# Patient Record
Sex: FEMALE | Employment: FULL TIME | URBAN - METROPOLITAN AREA
[De-identification: names, ages, dates, MRNs, and addresses within clinical notes are randomized per-mention and may not be internally consistent; named-entity substitution may affect disease eponyms.]

---

## 2018-03-30 ENCOUNTER — OFFICE VISIT (OUTPATIENT)
Dept: FAMILY MEDICINE | Facility: CLINIC | Age: 33
End: 2018-03-30
Payer: COMMERCIAL

## 2018-03-30 VITALS
HEART RATE: 68 BPM | TEMPERATURE: 98.1 F | WEIGHT: 158.8 LBS | BODY MASS INDEX: 28.14 KG/M2 | SYSTOLIC BLOOD PRESSURE: 120 MMHG | HEIGHT: 63 IN | DIASTOLIC BLOOD PRESSURE: 68 MMHG

## 2018-03-30 DIAGNOSIS — Z32.01 PREGNANCY CONFIRMED BY POSITIVE URINE TEST: Primary | ICD-10-CM

## 2018-03-30 LAB — BETA HCG QUAL IFA URINE: POSITIVE

## 2018-03-30 PROCEDURE — 99203 OFFICE O/P NEW LOW 30 MIN: CPT | Performed by: NURSE PRACTITIONER

## 2018-03-30 PROCEDURE — 84703 CHORIONIC GONADOTROPIN ASSAY: CPT | Performed by: NURSE PRACTITIONER

## 2018-03-30 NOTE — PATIENT INSTRUCTIONS
Congratulations!    Tylenol is safe to use in pregnancy  Claritin is safe to use    Results for orders placed or performed in visit on 03/30/18 (from the past 24 hour(s))   Beta HCG qual IFA urine - [facility] [place]   Result Value Ref Range    Beta HCG Qual IFA Urine Positive (A) NEG^Negative          Medical assistant will help you schedule a nurse visit for prenatal education    [facility]   Discharged by : Sandra CANTU MA    If you have any questions regarding your visit please contact your care team:     Team Gold                Clinic Hours Telephone Number     Dr. Radha Escobedo, NP 7am-7pm  Monday - Thursday   7am-5pm  Fridays  [phone]   (Appointment scheduling available 24/7)     RN Line  [phone] option 2     [facility] [place] [facility] 11am-9pm Monday-Friday Saturday-Sunday- 9am-5pm     [place] -   5pm-9pm Monday-Friday Saturday-Sunday- 9am-5pm    [phone] - [place]    [phone] - [place]       For a Price Quote for your services, please call our Consumer Price Line at [phone].     What options do I have for visits at the clinic other than the traditional office visit?     To expand how we care for you, many of our providers are utilizing electronic visits (e-visits) and telephone visits, when medically appropriate, for interactions with their patients rather than a visit in the clinic. We also offer nurse visits for many medical concerns. Just like any other service, we will bill your insurance company for this type of visit based on time spent on the phone with your provider. Not all insurance companies cover these visits. Please check with your medical insurance if this type of visit is covered. You will be responsible for any charges that are not paid by your insurance.   E-visits via [email or web address]: generally incur a $35.00 fee.     Telephone visits:    Time spent on the phone: *charged based on time that is spent on the phone in increments of 10 minutes. Estimated cost:   5-10 mins $30.00   11-20 mins. $59.00   21-30 mins. $85.00     Use [employer] (secure email communication and access to your chart) to send your primary care provider a message or make an appointment. Ask someone on your Team how to sign up for [employer].     As always, Thank you for trusting us with your health care needs!      [facility]:    Scheduling Appointments  [place], [place]  Call: [phone]    Pardeep Henry, [facility]  Call: [phone]    [facility]  Call: [phone]    For Gastroenterology referrals   [facility]   Clinics and Surgery Center, 4th Floor   [address] [place] [address]   Appointments: [phone]    WHERE TO GO FOR CARE?  Clinic    Make an appointment if you:       Are sick (cold, cough, flu, sore throat, earache or in pain).       Have a small injury (sprain, small cut, burn or broken bone).       Need a physical exam, Pap smear, vaccine or prescription refill.       Have questions about your health or medicines.    To reach us:      Call [phone] ([phone]). Open 24 hours every day. (For counseling services, call [phone].)    Log into [employer] at [email or web address]. (Visit [email or web address] to create an account.) Hospital emergency room    An emergency is a serious or life- threatening problem that must be treated right away.    Call 559 or get to the hospital if you have:      Very bad or sudden:            - Chest pain or pressure         - Bleeding         - Head or belly pain         - Dizziness or trouble seeing, walking or                          Speaking      Problems breathing      Blood in your vomit or you are coughing up blood      A major injury (knocked out, loss of a finger or limb, rape, broken bone protruding from skin)    A  mental health crisis. (Or call the Mental Health Crisis line at 1-623.696.9725 or Suicide Prevention Hotline at 1-328.776.2347.)    Open 24 hours every day. You don't need an appointment.     Urgent care    Visit urgent care for sickness or small injuries when the clinic is closed. You don't need an appointment. To check hours or find an urgent care near you, visit [email or web address]. Online care    Get online care from [facility] for more than 70 common problems, like colds, allergies and infections. Open 24 hours every day at:   [email or web address]   Need help deciding?    For advice about where to be seen, you may call your clinic and ask to speak with a nurse. We're here for you 24 hours every day.         If you are deaf or hard of hearing, please let us know. We provide many free services including sign language interpreters, oral interpreters, TTYs, telephone amplifiers, note takers and written materials.

## 2018-03-30 NOTE — NURSING NOTE
"Chief Complaint   Patient presents with     Pregnancy Test       Initial /68 (BP Location: Right arm, Patient Position: Sitting, Cuff Size: Adult Regular)  Pulse 68  Temp 98.1  F (36.7  C) (Oral)  Ht 5' 3.25\" (1.607 m)  Wt 158 lb 12.8 oz (72 kg)  LMP 02/10/2018  BMI 27.91 kg/m2 Estimated body mass index is 27.91 kg/(m^2) as calculated from the following:    Height as of this encounter: 5' 3.25\" (1.607 m).    Weight as of this encounter: 158 lb 12.8 oz (72 kg).  Medication Reconciliation: complete    "

## 2018-03-30 NOTE — PROGRESS NOTES
SUBJECTIVE:   Rashawn Jones is a 33 year old female who presents to clinic today for the following health issues:    Patient would like pregnancy test - 19 days late on period. Has NOT done home pregnancy test. Patient stated she and  have been trying for pregnancy since 2017. Patient has been feeling okay- just a little nausea and frequent urination, breast tenderness, a little tiredness.    LMP 2/10/2018 and normally gets regular monthly periods.  Was using condoms for the past 5 years prior to stopping in 2017.  Never been pregnant before.    Social History:  Born and raised in [place] until grade 12.  Came to [place] for college 2 years; then transferred to [place] for last 2 years.  Met her partner in [place].  Her dad is from [place], mom is from the [place] and they met in [place].  She reads and writes in Croatian but cannot speak it.  Speaks Sophie to father.  Speaks Tegalo to mother.    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  Additional history: as documented    Patient Active Problem List   Diagnosis     Pregnancy confirmed by positive urine test     Past Surgical History:   Procedure Laterality Date     NO HISTORY OF SURGERY         Social History   Substance Use Topics     Smoking status: Never Smoker     Smokeless tobacco: Never Used     Alcohol use No     Family History   Problem Relation Age of Onset     Hypertension Mother      Hypertension Father      DIABETES Father      CANCER Maternal Grandfather      Skin/ Nose?     DIABETES Paternal Grandmother      KIDNEY DISEASE Paternal Grandmother      was on dialysis     Myocardial Infarction Paternal Grandfather 50     x2,  from 2nd heart attack     No Known Problems Brother      No Known Problems Sister      No Known Problems Sister          Current Outpatient Prescriptions   Medication Sig Dispense Refill     Prenatal Vit-Fe Fumarate-FA (PRENATAL VITAMIN PO)  "Take 1 tablet by mouth daily       No Known Allergies    Reviewed and updated as needed this visit by clinical staff  Tobacco  Allergies  Meds  Problems  Med Hx  Surg Hx  Fam Hx  Soc Hx        Reviewed and updated as needed this visit by Provider  Allergies  Meds  Problems         ROS:  Constitutional, HEENT, cardiovascular, pulmonary, gi and gu systems are negative, except as otherwise noted.    OBJECTIVE:     /68 (BP Location: Right arm, Patient Position: Sitting, Cuff Size: Adult Regular)  Pulse 68  Temp 98.1  F (36.7  C) (Oral)  Ht 5' 3.25\" (1.607 m)  Wt 158 lb 12.8 oz (72 kg)  LMP 02/10/2018  BMI 27.91 kg/m2  Body mass index is 27.91 kg/(m^2).  GENERAL: healthy, alert and no distress  EYES: Eyes grossly normal to inspection, PERRL and conjunctivae and sclerae normal  HENT: ear canals and TM's normal, nose and mouth without ulcers or lesions  NECK: no adenopathy, no asymmetry, masses, or scars and thyroid normal to palpation  RESP: lungs clear to auscultation - no rales, rhonchi or wheezes  CV: regular rate and rhythm, normal S1 S2, no S3 or S4, no murmur, click or rub, no peripheral edema and peripheral pulses strong    Diagnostic Test Results:  Urine pregnancy test positive    ASSESSMENT/PLAN:     1. Pregnancy confirmed by positive urine test    - Beta HCG qual IFA urine - [facility] and [place]  - Prenatal Vit-Fe Fumarate-FA (PRENATAL VITAMIN PO); Take 1 tablet by mouth daily  - Briefly advised patient to eat healthy, get some exercise, avoid alcohol/smoke, take prenatal vitamin daily, and okay to take Tylenol (avoid NSAIDs) if needed.    FUTURE APPOINTMENTS:       - Follow-up visit with the nurse for prenatal education and Patient/guardian verbalized understanding and agreement with the plan.    Sofía Escobedo NP  [facility]  "

## 2018-03-30 NOTE — MR AVS SNAPSHOT
"              After Visit Summary   3/30/2018    Rashawn Jones    MRN: [id number]           Patient Information     Date Of Birth          1985        Visit Information        Provider Department      3/30/2018 1:40 PM Sofía Escobedo NP [facility]        Today's Diagnoses     Amenorrhea    -  1      Care Instructions    Congratulations!    Tylenol is safe to use in pregnancy  Claritin is safe to use    Results for orders placed or performed in visit on 03/30/18 (from the past 24 hour(s))   Beta HCG qual IFA urine - [facility] and [place]   Result Value Ref Range    Beta HCG Qual IFA Urine Positive (A) NEG^Negative          Medical assistant will help you schedule a nurse visit for prenatal education          Follow-ups after your visit        Who to contact     If you have questions or need follow up information about today's clinic visit or your schedule please contact [facility] directly at [phone].  Normal or non-critical lab and imaging results will be communicated to you by [employer], letter or phone within 4 business days after the clinic has received the results. If you do not hear from us within 7 days, please contact the clinic through Cell[employer] or phone. If you have a critical or abnormal lab result, we will notify you by phone as soon as possible.  Submit refill requests through [employer] or call your pharmacy and they will forward the refill request to us. Please allow 3 business days for your refill to be completed.          Additional Information About Your Visit        Cellayhart Information     [employer] lets you send messages to your doctor, view your test results, renew your prescriptions, schedule appointments and more. To sign up, go to [email or web address] . Click on \"Log in\" on the left side of the screen, which will take you to the Welcome page. Then click on \"Sign up Now\" on the right side of the page.     You will be asked to enter the access code " "listed below, as well as some personal information. Please follow the directions to create your username and password.     Your access code is: [email or web address]  Expires: 2018  2:55 PM     Your access code will  in 90 days. If you need help or a new code, please call your [place] clinic or [phone].        Care EveryWhere ID     This is your Care EveryWhere ID. This could be used by other organizations to access your [place] medical records  [id number]        Your Vitals Were     Pulse Temperature Height Last Period BMI (Body Mass Index)       68 98.1  F (36.7  C) (Oral) 5' 3.25\" (1.607 m) 02/10/2018 27.91 kg/m2        Blood Pressure from Last 3 Encounters:   18 120/68    Weight from Last 3 Encounters:   18 158 lb 12.8 oz (72 kg)              We Performed the Following     Beta HCG qual [facility] urine - [facility] and [place]        Primary Care Provider Office Phone # Fax #    [facility] [phone]       [address] [place] [address]        Equal Access to Services     RUTHIE THAYER : Hadii aad ku hadasho Soomaali, waaxda luqadaha, qaybta kaalmada adeegyada, waxay idiin hayaan adeeg bella lakalyn ah. So [facility] [place] [facility] [phone].    ATENCIÓN: Si habla español, tiene a kirkpatrick disposición servicios gratuitos de asistencia lingüística. Ronaldo al [phone].    We comply with applicable federal civil rights laws and [place] laws. We do not discriminate on the basis of race, color, national origin, age, disability, sex, sexual orientation, or gender identity.            Thank you!     Thank you for choosing [facility]  for your care. Our goal is always to provide you with excellent care. Hearing back from our patients is one way we can continue to improve our services. Please take a few minutes to complete the written survey that you may receive in the mail after your visit with us. Thank you!             Your Updated Medication List - Protect " others around you: Learn how to safely use, store and throw away your medicines at www.disposemymeds.org.          This list is accurate as of 3/30/18  2:55 PM.  Always use your most recent med list.                   Brand Name Dispense Instructions for use Diagnosis    PRENATAL VITAMIN PO      Take 1 tablet by mouth daily

## 2018-03-31 PROBLEM — Z32.01 PREGNANCY CONFIRMED BY POSITIVE URINE TEST: Status: ACTIVE | Noted: 2018-03-31

## 2018-04-12 ENCOUNTER — PRENATAL OFFICE VISIT (OUTPATIENT)
Dept: NURSING | Facility: CLINIC | Age: 33
End: 2018-04-12
Payer: COMMERCIAL

## 2018-04-12 VITALS
SYSTOLIC BLOOD PRESSURE: 112 MMHG | HEIGHT: 64 IN | BODY MASS INDEX: 27.31 KG/M2 | DIASTOLIC BLOOD PRESSURE: 64 MMHG | WEIGHT: 160 LBS | HEART RATE: 74 BPM

## 2018-04-12 DIAGNOSIS — Z34.00 SUPERVISION OF NORMAL FIRST PREGNANCY: Primary | ICD-10-CM

## 2018-04-12 LAB
ABO + RH BLD: NORMAL
ABO + RH BLD: NORMAL
ALBUMIN UR-MCNC: NEGATIVE MG/DL
APPEARANCE UR: CLEAR
BACTERIA #/AREA URNS HPF: ABNORMAL /HPF
BILIRUB UR QL STRIP: NEGATIVE
BLD GP AB SCN SERPL QL: NORMAL
BLOOD BANK CMNT PATIENT-IMP: NORMAL
COLOR UR AUTO: YELLOW
ERYTHROCYTE [DISTWIDTH] IN BLOOD BY AUTOMATED COUNT: 13.9 % (ref 10–15)
GLUCOSE UR STRIP-MCNC: NEGATIVE MG/DL
HCT VFR BLD AUTO: 35.3 % (ref 35–47)
HGB BLD-MCNC: 11.9 G/DL (ref 11.7–15.7)
HGB UR QL STRIP: NEGATIVE
KETONES UR STRIP-MCNC: NEGATIVE MG/DL
LEUKOCYTE ESTERASE UR QL STRIP: ABNORMAL
MCH RBC QN AUTO: 28 PG (ref 26.5–33)
MCHC RBC AUTO-ENTMCNC: 33.7 G/DL (ref 31.5–36.5)
MCV RBC AUTO: 83 FL (ref 78–100)
NITRATE UR QL: NEGATIVE
NON-SQ EPI CELLS #/AREA URNS LPF: ABNORMAL /LPF
PH UR STRIP: 6 PH (ref 5–7)
PLATELET # BLD AUTO: 286 10E9/L (ref 150–450)
RBC # BLD AUTO: 4.25 10E12/L (ref 3.8–5.2)
RBC #/AREA URNS AUTO: ABNORMAL /HPF
SOURCE: ABNORMAL
SP GR UR STRIP: 1.01 (ref 1–1.03)
SPECIMEN EXP DATE BLD: NORMAL
UROBILINOGEN UR STRIP-ACNC: 0.2 EU/DL (ref 0.2–1)
WBC # BLD AUTO: 8.4 10E9/L (ref 4–11)
WBC #/AREA URNS AUTO: ABNORMAL /HPF

## 2018-04-12 PROCEDURE — 36415 COLL VENOUS BLD VENIPUNCTURE: CPT | Performed by: OBSTETRICS & GYNECOLOGY

## 2018-04-12 PROCEDURE — 87340 HEPATITIS B SURFACE AG IA: CPT | Performed by: OBSTETRICS & GYNECOLOGY

## 2018-04-12 PROCEDURE — 85027 COMPLETE CBC AUTOMATED: CPT | Performed by: OBSTETRICS & GYNECOLOGY

## 2018-04-12 PROCEDURE — 99207 ZZC NO CHARGE NURSE ONLY: CPT

## 2018-04-12 PROCEDURE — 81001 URINALYSIS AUTO W/SCOPE: CPT | Performed by: OBSTETRICS & GYNECOLOGY

## 2018-04-12 PROCEDURE — 86900 BLOOD TYPING SEROLOGIC ABO: CPT | Performed by: OBSTETRICS & GYNECOLOGY

## 2018-04-12 PROCEDURE — 86780 TREPONEMA PALLIDUM: CPT | Performed by: OBSTETRICS & GYNECOLOGY

## 2018-04-12 PROCEDURE — 87086 URINE CULTURE/COLONY COUNT: CPT | Performed by: OBSTETRICS & GYNECOLOGY

## 2018-04-12 PROCEDURE — 86901 BLOOD TYPING SEROLOGIC RH(D): CPT | Performed by: OBSTETRICS & GYNECOLOGY

## 2018-04-12 PROCEDURE — 86762 RUBELLA ANTIBODY: CPT | Performed by: OBSTETRICS & GYNECOLOGY

## 2018-04-12 PROCEDURE — 87389 HIV-1 AG W/HIV-1&-2 AB AG IA: CPT | Performed by: OBSTETRICS & GYNECOLOGY

## 2018-04-12 PROCEDURE — 86850 RBC ANTIBODY SCREEN: CPT | Performed by: OBSTETRICS & GYNECOLOGY

## 2018-04-12 NOTE — PROGRESS NOTES
Prenatal OB Questionnaire  Past Medical History  Diabetes   No  Hypertension   No  Heart Disease, mitral valve prolapse, or rheumatic fever?   No  An autoimmune disorder such as Lupus or Rheumatoid Arthritis?   No  Kidney Disease or Urinary Tract Infection?   No  Epilepsy, seizures or spells?   No  Migraine headaches?   No  A stroke or loss of function or sensation?   No  Any other neurological problems?   No  Have you ever been treated for depression?  No  Are you having problems with crying spells or loss of self-esteem?   No  Have you ever required psychiatric care?   No  Have you ever hepatitis, liver disease or jaundice?   No  Have you ever been treated for blood clots in your veins, deep venous thrombosis, inflammation in the veins, thrombosis, phlebitis, pulmonary embolism or varicosities?   No  Have you had excessive bleeding after surgery or dental work?   No  Do you bleed more than other women after a cut or scratch?   No  Do you have a history of anemia?   No  Have you ever been treated for thyroid problems or taken thyroid medication?  No  Do you have any other endocrine problems?  No  Have you ever been in a major accident or suffered serious trauma?   No  Within the last year, has anyone hit slapped, kicked or otherwise hurt you?  No  In the last year, has anyone forced you to have sex when you didn't want to?  No  Have you ever had a blood transfusion?   No  Would you refuse a blood transfusion if a doctor judged it to be medically necessary?   No  If you answered yes, would you rather die than have a blood transfusion?   No  If you answered yes, is this for Scientology reasons?   No  Does anyone in your home smoke?   No  Do you use tobacco products?  No  Do you drink beer, wine, hard liquor?  No  Do you use any of the following: marijuana, speed, cocaine, heroine, hallucinogens, or other drugs?  No  Is your blood type Rh negative?   No  Have you ever had abnormal antibodies in your blood?   No  Have  you ever had asthma?   No  Have you ever had tuberculosis?   No  Do you have any allergies to drugs or over-the-counter medications?   No    Allergies as of 4/12/2018:    Allergies as of 04/12/2018     (No Known Allergies)       Do you have any breast problems?   No  Have you ever ?   No  Have you had any gynecological surgical procedures such as cervical conization, a LEEP procedure, laser treatment, cryosurgery of the cervix, or a dilation and curettage, etc?  No  Have you had any other surgical procedures?  No  Have you been hospitalized for a nonsurgical reason excluding normal delivery?   No  Have you ever had any anesthetic complications?   No  Have you ever had an abnormal pap smear?   No  Do you have a history of abnormalities of the uterus?   No  Did it take you more than one year to become pregnant?   No  Have you ever been evaluated or treated for infertility?   No  Is there a history of medical problems in your family, which you feel might adversely affect your health or pregnancy?   No  Do you have any other problems we have not asked you about which you feel may be important to this pregnancy?  No    Symptoms since Last Menstrual Period  Do you have any of the following:    *abdominal pain  No  *blood in stool or urine  No  *chest pain  No  *shortness of breath  No  *coughing or vomiting up blood No  *heart racing or skipping beats  No  *nausea and vomiting  No  *pain with urination  No  *vaginal discharge or bleeding  No  Current medications are:  Current Outpatient Prescriptions   Medication Sig Dispense Refill     Prenatal Vit-Fe Fumarate-FA (PRENATAL VITAMIN PO) Take 1 tablet by mouth daily         Genetic Screening  At the time of birth, will you be 35 years old or older?  No  Has the patient, baby s father, or anyone in either family had:  Thalassemia (Italian, Greek, Mediterranean, or  background only) and an MCV result less than 80?  No  Neural tube defect such as  meningomyelocele, spina bifida or anencephaly?  No  Congenital heart defect?  No  Down s syndrome?  No  Jonathon-Sach s disease (Caodaism, Cajun, Burundian-Honduran)?  No  Sickle cell disease or trait ([place])?  No  Hemophilia or other inherited problems of blood coagulation? No  Muscular dystrophy?  No  Cystic Fibrosis?  No  [place] s chorea?  No  Mental retardation/autism? No   If yes, was the person tested for fragile X?  No  Any other inherited genetic or chromosomal disorder?  No  Maternal metabolic disorder (e.g. insulin-dependent diabetes, PKU)? No  A child with birth defects not listed above?  No  Recurrent pregnancy loss or a stillbirth?  No  Has the patient had any medications/street drugs/alcohol since her last menstrual period? No  Does the patient or baby s father have any other genetic risks?  No  Infection History  Do you object to being tested for Hepatitis B? No  Do you object to being tested for HIV? No  Do you feel that you are at high risk for coming in contact with the AIDS virus?  No  Have you ever been treated for tuberculosis?  No  Have you ever received the BCG vaccine for tuberculosis? yes  Have you ever had a positive skin test for tuberculosis? Yes due to immu  Do you live with someone who has tuberculosis?  No  Have you ever been exposed to tuberculosis?  No  Do you have genital herpes?  No  Does your partner have genital herpes?  No  Have you had a rash or viral illness since your last period?  No  Have you ever had Gonorrhea, Chlamydia, Syphilis, venereal warts, trichomoniasis, pelvic inflammatory disease or any other sexually transmitted disease?  No  Do you know if you are a genital group B streptococcus carrier? No  You have not had chicken pox/varicella  has had the chicken pox as a child  Have you been vaccinated against chicken pox?  No  Have you had any other infectious disease? No        Early ultrasound screening tool:    Does patient have irregular periods?  No  Did patient use  hormonal birth control in the three months prior to positive urine pregnancy test? No  Is the patient breastfeeding?  No  Is the patient 10 weeks or greater at time of education visit?  No    No early ultrasound indicated    Patient presents to clinic today prenatal education. This is the patient's 0 pregnancy. She has had 0 miscarriage(s), 0 (s), 0 term birth(s) and 0  birth(s). She has 0 living child(denita).   This {was a planned pregnancy.  Reviewed answers to patient's Prenatal OB Questionnaire.    Medical, surgical, family and ObGyn history updated as appropriate. Reviewed current medications and allergies. Patient {is taking prenatal vitamins.     Discussed all of the options within [place] for her prenatal care including Dr. Phillips and Dr. Goldstein at [facility], midwives at [facility] and OB/GYN-Dr. Rg and Dr. Reyes. Next visit scheduled with Dr Rg.     Reviewed and discussed OB 1st Trimester Plans/Education  and also summarized in detail handouts and brochures included in OB Prenatal Folder that patient is able to keep and bring home with her.    Discussed all of the blood tests with pt who agrees to have all including HIV. Pt aware that results will be discussed at next office visit with MD unless abnl results are indicated and phone call will be made.    Will forward chart on to Prenatal Care Provider to review.    Daniela Tristan,Clinic Rn  [place]

## 2018-04-12 NOTE — MR AVS SNAPSHOT
"              After Visit Summary   4/12/2018    Rashawn Jones    MRN: [id number]           Patient Information     Date Of Birth          1985        Visit Information        Provider Department      4/12/2018 11:00 AM NE RN [facility]        Today's Diagnoses     Supervision of normal first pregnancy    -  1       Follow-ups after your visit        Your next 10 appointments already scheduled     Apr 25, 2018  3:30 PM CDT   New Prenatal with Marla Rg [facility]   [facility]    [address] [place] [address] [phone]              Who to contact     If you have questions or need follow up information about today's clinic visit or your schedule please contact [facility] directly at [phone].  Normal or non-critical lab and imaging results will be communicated to you by MyChart, letter or phone within 4 business days after the clinic has received the results. If you do not hear from us within 7 days, please contact the clinic through MyChart or phone. If you have a critical or abnormal lab result, we will notify you by phone as soon as possible.  Submit refill requests through [employer] or call your pharmacy and they will forward the refill request to us. Please allow 3 business days for your refill to be completed.          Additional Information About Your Visit        MyChart Information     [facility] [employer] lets you send messages to your doctor, view your test results, renew your prescriptions, schedule appointments and more. To sign up, go to [email or web address] . Click on \"Log in\" on the left side of the screen, which will take you to the Welcome page. Then click on \"Sign up Now\" on the right side of the page.     You will be asked to enter the access code listed below, as well as some personal information. Please follow the directions to create your username and password.     Your access " "code is: [email or web address]  Expires: 2018  2:55 PM     Your access code will  in 90 days. If you need help or a new code, please call your [place] clinic or [phone].        Care EveryWhere ID     This is your Care EveryWhere ID. This could be used by other organizations to access your [place] medical records  [id number]        Your Vitals Were     Pulse Height Last Period BMI (Body Mass Index)          74 5' 4.17\" (1.63 m) 02/10/2018 27.32 kg/m2         Blood Pressure from Last 3 Encounters:   18 112/64   18 120/68    Weight from Last 3 Encounters:   18 160 lb (72.6 kg)   18 158 lb 12.8 oz (72 kg)              We Performed the Following     ABO/RH Type and Screen     Anti Treponema     CBC with Platelets     Hepatitis B surface antigen     HIV Antigen Antibody Combo     Rubella Antibody IgG Quantitative     UA  Macroscopoic with Reflex to Micro     Urine Culture Aerobic Bacterial        Primary Care Provider Office Phone # Fax #    [facility] [phone]       [address] [facility] [address]        Equal Access to Services     RUTHIE THAYER AH: Hadii aad ku hadasho Soomaali, waaxda luqadaha, qaybta kaalmada adeegyada, waxay idiin hayhenriquen alexander blanco lakalyn de la cruz. So [facility] [phone].    ATENCIÓN: Si habla español, tiene a kirkpatrick disposición servicios gratuitos de asistencia lingüística. Llame al [phone].    We comply with applicable federal civil rights laws and [place] laws. We do not discriminate on the basis of race, color, national origin, age, disability, sex, sexual orientation, or gender identity.            Thank you!     Thank you for choosing [facility]  for your care. Our goal is always to provide you with excellent care. Hearing back from our patients is one way we can continue to improve our services. Please take a few minutes to complete the written survey that you may receive in the mail after your " visit with us. Thank you!             Your Updated Medication List - Protect others around you: Learn how to safely use, store and throw away your medicines at www.disposemymeds.org.          This list is accurate as of 4/12/18 11:48 AM.  Always use your most recent med list.                   Brand Name Dispense Instructions for use Diagnosis    PRENATAL VITAMIN PO      Take 1 tablet by mouth daily    Pregnancy confirmed by positive urine test

## 2018-04-13 LAB
BACTERIA SPEC CULT: NO GROWTH
HBV SURFACE AG SERPL QL IA: NONREACTIVE
HIV 1+2 AB+HIV1 P24 AG SERPL QL IA: NONREACTIVE
RUBV IGG SERPL IA-ACNC: 81 IU/ML
SPECIMEN SOURCE: NORMAL
T PALLIDUM IGG+IGM SER QL: NEGATIVE

## 2018-04-25 ENCOUNTER — HEALTH MAINTENANCE LETTER (OUTPATIENT)
Age: 33
End: 2018-04-25

## 2018-04-25 ENCOUNTER — PRENATAL OFFICE VISIT (OUTPATIENT)
Dept: OBGYN | Facility: CLINIC | Age: 33
End: 2018-04-25
Payer: COMMERCIAL

## 2018-04-25 VITALS
HEART RATE: 65 BPM | HEIGHT: 64 IN | TEMPERATURE: 98.8 F | BODY MASS INDEX: 27.21 KG/M2 | WEIGHT: 159.4 LBS | DIASTOLIC BLOOD PRESSURE: 71 MMHG | OXYGEN SATURATION: 100 % | SYSTOLIC BLOOD PRESSURE: 112 MMHG

## 2018-04-25 DIAGNOSIS — Z34.01 NORMAL FIRST PREGNANCY IN FIRST TRIMESTER: Primary | ICD-10-CM

## 2018-04-25 PROCEDURE — G0145 SCR C/V CYTO,THINLAYER,RESCR: HCPCS | Performed by: OBSTETRICS & GYNECOLOGY

## 2018-04-25 PROCEDURE — 87624 HPV HI-RISK TYP POOLED RSLT: CPT | Performed by: OBSTETRICS & GYNECOLOGY

## 2018-04-25 PROCEDURE — 99207 ZZC FIRST OB VISIT: CPT | Performed by: OBSTETRICS & GYNECOLOGY

## 2018-04-25 NOTE — MR AVS SNAPSHOT
"              After Visit Summary   4/25/2018    Rashawn Jones    MRN: [id number]           Patient Information     Date Of Birth          1985        Visit Information        Provider Department      4/25/2018 3:30 PM Marla Rg MD [facility]        Today's Diagnoses     Normal first pregnancy in first trimester    -  1       Follow-ups after your visit        Who to contact     If you have questions or need follow up information about today's clinic visit or your schedule please contact [facility] [place] directly at [phone].  Normal or non-critical lab and imaging results will be communicated to you by [employer], letter or phone within 4 business days after the clinic has received the results. If you do not hear from us within 7 days, please contact the clinic through [employer] or phone. If you have a critical or abnormal lab result, we will notify you by phone as soon as possible.  Submit refill requests through [employer] or call your pharmacy and they will forward the refill request to us. Please allow 3 business days for your refill to be completed.          Additional Information About Your Visit        MyChart Information     [employer] gives you secure access to your electronic health record. If you see a primary care provider, you can also send messages to your care team and make appointments. If you have questions, please call your primary care clinic.  If you do not have a primary care provider, please call [phone] and they will assist you.        Care EveryWhere ID     This is your Care EveryWhere ID. This could be used by other organizations to access your [facility] medical records  [id number]        Your Vitals Were     Pulse Temperature Height Last Period Pulse Oximetry BMI (Body Mass Index)    65 98.8  F (37.1  C) (Oral) 5' 4.14\" (1.629 m) 02/10/2018 100% 27.24 kg/m2       Blood Pressure from Last 3 Encounters:   04/25/18 112/71   04/12/18 112/64 "   03/30/18 120/68    Weight from Last 3 Encounters:   04/25/18 159 lb 6.4 oz (72.3 kg)   04/12/18 160 lb (72.6 kg)   03/30/18 158 lb 12.8 oz (72 kg)              We Performed the Following     HPV High Risk Types DNA Cervical     Pap imaged thin layer screen with HPV - recommended age 30 - 65 years (select HPV order below)     US OB Limited One Or More Fetuses        Primary Care Provider Office Phone # Fax #    [facility] [phone]       [address] [facility] [address]        Equal Access to Services     RUTHIE THAYER : Hadseferino Betancourt, marcus crocker, martha kaalmaerin mendenhall, tip welch . So [facility] [phone].    ATENCIÓN: Si habla español, tiene a kirkpatrick disposición servicios gratuitos de asistencia lingüística. Llame al [phone].    We comply with applicable federal civil rights laws and [place] laws. We do not discriminate on the basis of race, color, national origin, age, disability, sex, sexual orientation, or gender identity.            Thank you!     Thank you for choosing [facility]  for your care. Our goal is always to provide you with excellent care. Hearing back from our patients is one way we can continue to improve our services. Please take a few minutes to complete the written survey that you may receive in the mail after your visit with us. Thank you!             Your Updated Medication List - Protect others around you: Learn how to safely use, store and throw away your medicines at www.disposemymeds.org.          This list is accurate as of 4/25/18  5:47 PM.  Always use your most recent med list.                   Brand Name Dispense Instructions for use Diagnosis    PRENATAL VITAMIN PO      Take 1 tablet by mouth daily    Pregnancy confirmed by positive urine test

## 2018-04-25 NOTE — PROGRESS NOTES
"SUBJECTIVE:   Rashawn Jones is a  33 year old female  @ 10w4d by LMP who presents for a new Ob visit.  Here with her , Royal.    She is feeling well but quite nauseated and tired.  Craving meat and greens.   Has had no bleeding or spotting.   No US yet.     POBHx: none      Patient Active Problem List   Diagnosis     Pregnancy confirmed by positive urine test       No past medical history on file.    Past Surgical History:   Procedure Laterality Date     NO HISTORY OF SURGERY          Current Outpatient Prescriptions   Medication     Prenatal Vit-Fe Fumarate-FA (PRENATAL VITAMIN PO)     No current facility-administered medications for this visit.        Allergies   Allergen Reactions     No Clinical Screening - See Comments      Chick peas and cumcumber         EXAM:  /71 (BP Location: Right arm, Patient Position: Sitting, Cuff Size: Adult Regular)  Pulse 65  Temp 98.8  F (37.1  C) (Oral)  Ht 5' 4.14\" (1.629 m)  Wt 159 lb 6.4 oz (72.3 kg)  LMP 02/10/2018  SpO2 100%  BMI 27.24 kg/m2    GENERAL: WDWN F in NAD  HEENT: no abnormalities  NECK: without thyromegaly or adenopathy  CHEST: clear to auscultation  CV: RRR without murmur  BREASTS: no palpable masses or adenopathy, no asymmetry or skin dimpling  ABDOMEN: S, NT, no palpable masses or hepatosplenomegaly  MUSCULOSKELETAL: no obvious abnormalities.  NEUROLOGICAL: normal strength, sensation, mental status  PSYCH: normal affect, appropriate  PELVIC: EG - normal adult female.  BUS - within normal limits.  Vagina - well rugated, no discharge.  Cervix - no lesions, no CMT.  Uterus - 10 wk  size and nontender.  Adnexae - no masses or tenderness.  RV - deferred.    BEDSIDE U/S: no fetal heart tones with doptone so bd u/s shows single live IUP, normal yolk sac and gestational sac, normal cardiac activity and fetal movement,  CRL= 10w4d        ASSESSMENT/ PLAN:  IUP @ 10w4d by LMP c/w bedside US today.     Pap done today.     Discussed routine " prenatal care and first trimester screen testing.    She will see in insurance covers the  the first trimester screen and call to let us know if she wants it.      Patient was counselled on routine prenatal care, diet and nutrition.  discussed iron rich diet.   New Ob labs reviewed today.    Return to Clinic in 4 weeks or sooner if problems arise.    Marla Rg MD

## 2018-04-25 NOTE — PROGRESS NOTES
"Chief Complaint   Patient presents with     Prenatal Care     10+4.       Initial Ht 5' 4.14\" (1.629 m)  LMP 02/10/2018 Estimated body mass index is 27.32 kg/(m^2) as calculated from the following:    Height as of 18: 5' 4.17\" (1.63 m).    Weight as of 18: 160 lb (72.6 kg).  BP completed using cuff size: regular        The following HM Due: pap smear      The following patient reported/Care Every where data was sent to:  P ABSTRACT QUALITY INITIATIVES [40619]  n/a      patient has appointment for today and orders have been placed              "

## 2018-04-27 LAB
COPATH REPORT: NORMAL
PAP: NORMAL

## 2018-05-01 LAB
FINAL DIAGNOSIS: NORMAL
HPV HR 12 DNA CVX QL NAA+PROBE: NEGATIVE
HPV16 DNA SPEC QL NAA+PROBE: NEGATIVE
HPV18 DNA SPEC QL NAA+PROBE: NEGATIVE
SPECIMEN DESCRIPTION: NORMAL
SPECIMEN SOURCE CVX/VAG CYTO: NORMAL

## 2018-06-04 ENCOUNTER — PRENATAL OFFICE VISIT (OUTPATIENT)
Dept: OBGYN | Facility: CLINIC | Age: 33
End: 2018-06-04
Payer: COMMERCIAL

## 2018-06-04 VITALS
WEIGHT: 162.8 LBS | HEART RATE: 65 BPM | DIASTOLIC BLOOD PRESSURE: 69 MMHG | TEMPERATURE: 98.7 F | OXYGEN SATURATION: 100 % | BODY MASS INDEX: 27.79 KG/M2 | HEIGHT: 64 IN | SYSTOLIC BLOOD PRESSURE: 124 MMHG

## 2018-06-04 DIAGNOSIS — Z34.02 NORMAL FIRST PREGNANCY IN SECOND TRIMESTER: Primary | ICD-10-CM

## 2018-06-04 PROCEDURE — 99207 ZZC PRENATAL VISIT: CPT | Performed by: OBSTETRICS & GYNECOLOGY

## 2018-06-04 PROCEDURE — 82306 VITAMIN D 25 HYDROXY: CPT | Performed by: OBSTETRICS & GYNECOLOGY

## 2018-06-04 PROCEDURE — 36415 COLL VENOUS BLD VENIPUNCTURE: CPT | Performed by: OBSTETRICS & GYNECOLOGY

## 2018-06-04 PROCEDURE — 99000 SPECIMEN HANDLING OFFICE-LAB: CPT | Performed by: OBSTETRICS & GYNECOLOGY

## 2018-06-04 PROCEDURE — 81511 FTL CGEN ABNOR FOUR ANAL: CPT | Mod: 90 | Performed by: OBSTETRICS & GYNECOLOGY

## 2018-06-04 NOTE — PROGRESS NOTES
16w2d  Feeling so much better.  Able to get up and do stuff instead of just sleeping.   Thinks she might have felt movement.   Desires quad screen.  US request placed.  NATALIIA

## 2018-06-04 NOTE — MR AVS SNAPSHOT
After Visit Summary   6/4/2018    Rashawn Jones    MRN: [id number]           Patient Information     Date Of Birth          1985        Visit Information        Provider Department      6/4/2018 12:00 PM Marla Rg MD [facility]        Today's Diagnoses     Normal first pregnancy in second trimester    -  1       Follow-ups after your visit        Your next 10 appointments already scheduled     Jun 25, 2018  7:40 AM CDT   US OB > 14 WEEKS COMPLETE SINGLE with RDUS1   [facility] ([facility])    [address] [place] [id number] [phone]           Please bring a list of your medicines (including vitamins, minerals and over-the-counter drugs). Also, tell your doctor about any allergies you may have. Wear comfortable clothes and leave your valuables at home.  If you re less than 20 weeks drink four 8-ounce glasses of fluid an hour before your exam. If you need to empty your bladder before your exam, try to release only a little urine. Then, drink another glass of fluid.  You may have up to two family members in the exam room. If you bring a small child, an adult must be there to care for him or her.  Please call the Imaging Department at your exam site with any questions.            Jun 26, 2018  1:30 PM CDT   ESTABLISHED PRENATAL with Marla Rg MD   [facility]    [address] [place] [address] [phone]              Future tests that were ordered for you today     Open Future Orders        Priority Expected Expires Ordered    US OB > 14 Weeks Complete Single Routine  6/4/2019 6/4/2018            Who to contact     If you have questions or need follow up information about today's clinic visit or your schedule please contact [facility] directly at [phone].  Normal or non-critical lab and imaging results  "will be communicated to you by MyChart, letter or phone within 4 business days after the clinic has received the results. If you do not hear from us within 7 days, please contact the clinic through [employer] or phone. If you have a critical or abnormal lab result, we will notify you by phone as soon as possible.  Submit refill requests through [employer] or call your pharmacy and they will forward the refill request to us. Please allow 3 business days for your refill to be completed.          Additional Information About Your Visit        [employer] Information     [employer] gives you secure access to your electronic health record. If you see a primary care provider, you can also send messages to your care team and make appointments. If you have questions, please call your primary care clinic.  If you do not have a primary care provider, please call [phone] and they will assist you.        Care EveryWhere ID     This is your Care EveryWhere ID. This could be used by other organizations to access your [facility] medical records  [id number]        Your Vitals Were     Pulse Temperature Height Last Period Pulse Oximetry BMI (Body Mass Index)    65 98.7  F (37.1  C) (Oral) 5' 4.14\" (1.629 m) 02/10/2018 100% 27.82 kg/m2       Blood Pressure from Last 3 Encounters:   06/04/18 124/69   04/25/18 112/71   04/12/18 112/64    Weight from Last 3 Encounters:   06/04/18 162 lb 12.8 oz (73.8 kg)   04/25/18 159 lb 6.4 oz (72.3 kg)   04/12/18 160 lb (72.6 kg)              We Performed the Following     Maternal Quad Marker 2nd Trimester     Vitamin D Deficiency        Primary Care Provider Office Phone # Fax #    [facility] [phone]       [address] [place] [address]        Equal Access to Services     RUTHIE THAYER AH: Libia Betancourt, wakam crocker, qaybta kaalmatip dewitt. [facility] [phone].    ATENCIÓN: Si yoon calle, " tiene a kirkpatrick disposición servicios gratuitos de asistencia lingüística. Ronaldo dennison [phone].    We comply with applicable federal civil rights laws and [place] laws. We do not discriminate on the basis of race, color, national origin, age, disability, sex, sexual orientation, or gender identity.            Thank you!     Thank you for choosing [facility]  for your care. Our goal is always to provide you with excellent care. Hearing back from our patients is one way we can continue to improve our services. Please take a few minutes to complete the written survey that you may receive in the mail after your visit with us. Thank you!             Your Updated Medication List - Protect others around you: Learn how to safely use, store and throw away your medicines at www.disposemymeds.org.          This list is accurate as of 6/4/18  2:28 PM.  Always use your most recent med list.                   Brand Name Dispense Instructions for use Diagnosis    PRENATAL VITAMIN PO      Take 1 tablet by mouth daily    Pregnancy confirmed by positive urine test

## 2018-06-05 LAB — DEPRECATED CALCIDIOL+CALCIFEROL SERPL-MC: 32 UG/L (ref 20–75)

## 2018-06-06 LAB
# FETUSES US: NORMAL
# FETUSES: NORMAL
AFP ADJ MOM AMN: 1.06
AFP SERPL-MCNC: 34 NG/ML
AGE - REPORTED: 31.7 YR
CURRENT SMOKER: NO
CURRENT SMOKER: NO
DIABETES STATUS PATIENT: NO
FAMILY MEMBER DISEASES HX: NO
FAMILY MEMBER DISEASES HX: NO
GA METHOD: NORMAL
GA METHOD: NORMAL
GA: NORMAL WK
HCG MOM SERPL: 1.34
HCG SERPL-ACNC: NORMAL IU/L
HX OF HEREDITARY DISORDERS: NO
IDDM PATIENT QL: NO
INHIBIN A MOM SERPL: 1.1
INHIBIN A SERPL-MCNC: 176 PG/ML
INTEGRATED SCN PATIENT-IMP: NORMAL
IVF PREGNANCY: NORMAL
LMP START DATE: NORMAL
MONOCHORIONIC TWINS: NO
PATHOLOGY STUDY: NORMAL
PREV FETUS DEFECT: NO
SERVICE CMNT-IMP: NO
SPECIMEN DRAWN SERPL: NORMAL
U ESTRIOL MOM SERPL: 1.06
U ESTRIOL SERPL-MCNC: 0.99 NG/ML
VALPROIC/CARBAMAZEPINE STATUS: NO
WEIGHT UNITS: NORMAL

## 2018-06-25 ENCOUNTER — RADIANT APPOINTMENT (OUTPATIENT)
Dept: ULTRASOUND IMAGING | Facility: CLINIC | Age: 33
End: 2018-06-25
Attending: OBSTETRICS & GYNECOLOGY
Payer: COMMERCIAL

## 2018-06-25 DIAGNOSIS — Z34.02 NORMAL FIRST PREGNANCY IN SECOND TRIMESTER: ICD-10-CM

## 2018-06-25 PROCEDURE — 76805 OB US >/= 14 WKS SNGL FETUS: CPT | Performed by: OBSTETRICS & GYNECOLOGY

## 2018-06-26 ENCOUNTER — PRENATAL OFFICE VISIT (OUTPATIENT)
Dept: OBGYN | Facility: CLINIC | Age: 33
End: 2018-06-26
Payer: COMMERCIAL

## 2018-06-26 VITALS
SYSTOLIC BLOOD PRESSURE: 113 MMHG | DIASTOLIC BLOOD PRESSURE: 69 MMHG | TEMPERATURE: 98.6 F | HEART RATE: 65 BPM | WEIGHT: 170.6 LBS | HEIGHT: 64 IN | BODY MASS INDEX: 29.12 KG/M2 | OXYGEN SATURATION: 99 %

## 2018-06-26 DIAGNOSIS — Z34.02 NORMAL FIRST PREGNANCY IN SECOND TRIMESTER: Primary | ICD-10-CM

## 2018-06-26 PROCEDURE — 99207 ZZC PRENATAL VISIT: CPT | Performed by: OBSTETRICS & GYNECOLOGY

## 2018-06-26 ASSESSMENT — ANXIETY QUESTIONNAIRES
2. NOT BEING ABLE TO STOP OR CONTROL WORRYING: NOT AT ALL
7. FEELING AFRAID AS IF SOMETHING AWFUL MIGHT HAPPEN: NOT AT ALL
1. FEELING NERVOUS, ANXIOUS, OR ON EDGE: NOT AT ALL
GAD7 TOTAL SCORE: 1
IF YOU CHECKED OFF ANY PROBLEMS ON THIS QUESTIONNAIRE, HOW DIFFICULT HAVE THESE PROBLEMS MADE IT FOR YOU TO DO YOUR WORK, TAKE CARE OF THINGS AT HOME, OR GET ALONG WITH OTHER PEOPLE: NOT DIFFICULT AT ALL
5. BEING SO RESTLESS THAT IT IS HARD TO SIT STILL: NOT AT ALL
3. WORRYING TOO MUCH ABOUT DIFFERENT THINGS: SEVERAL DAYS
6. BECOMING EASILY ANNOYED OR IRRITABLE: NOT AT ALL

## 2018-06-26 ASSESSMENT — PATIENT HEALTH QUESTIONNAIRE - PHQ9: 5. POOR APPETITE OR OVEREATING: NOT AT ALL

## 2018-06-26 NOTE — MR AVS SNAPSHOT
After Visit Summary   6/26/2018    Rashawn Jones    MRN: [id number]           Patient Information     Date Of Birth          1985        Visit Information        Provider Department      6/26/2018 1:30 PM Marla Rg MD [facility]        Today's Diagnoses     Normal first pregnancy in second trimester    -  1       Follow-ups after your visit        Your next 10 appointments already scheduled     Jul 26, 2018 11:15 AM CDT   ESTABLISHED PRENATAL with Marla Rg MD   [facility] ([facility]    [address] [place] [address]   [phone]              Future tests that were ordered for you today     Open Future Orders        Priority Expected Expires Ordered    US OB > 14 Weeks Complete Single Routine  6/26/2019 6/26/2018            Who to contact     If you have questions or need follow up information about today's clinic visit or your schedule please contact [facility] directly at [phone].  Normal or non-critical lab and imaging results will be communicated to you by [employer], letter or phone within 4 business days after the clinic has received the results. If you do not hear from us within 7 days, please contact the clinic through [employer] or phone. If you have a critical or abnormal lab result, we will notify you by phone as soon as possible.  Submit refill requests through [employer] or call your pharmacy and they will forward the refill request to us. Please allow 3 business days for your refill to be completed.          Additional Information About Your Visit        [employer] Information     [employer] gives you secure access to your electronic health record. If you see a primary care provider, you can also send messages to your care team and make appointments. If you have questions, please call your primary care clinic.  If you do not have a primary care provider, please call  "[phone] and they will assist you.        Care EveryWhere ID     This is your Care EveryWhere ID. This could be used by other organizations to access your [facility] medical records  [id number]        Your Vitals Were     Pulse Temperature Height Last Period Pulse Oximetry BMI (Body Mass Index)    65 98.6  F (37  C) (Oral) 5' 4.14\" (1.629 m) 02/10/2018 99% 29.16 kg/m2       Blood Pressure from Last 3 Encounters:   06/26/18 113/69   06/04/18 124/69   04/25/18 112/71    Weight from Last 3 Encounters:   06/26/18 170 lb 9.6 oz (77.4 kg)   06/04/18 162 lb 12.8 oz (73.8 kg)   04/25/18 159 lb 6.4 oz (72.3 kg)               Primary Care Provider Office Phone # Fax #    [facility] [phone]       [address] [place] [address]        Equal Access to Services     RUTHIE THAYER AH: Hadii loraine ku hadasho Soomaali, waaxda luqadaha, qaybta kaalmada adeegyada, waxevie welch . So [facility] [phone].    ATENCIÓN: Si habla español, tiene a kirkpatrick disposición servicios gratuitos de asistencia lingüística. Llame al [phone].    We comply with applicable federal civil rights laws and [place] laws. We do not discriminate on the basis of race, color, national origin, age, disability, sex, sexual orientation, or gender identity.            Thank you!     Thank you for choosing [facility]  for your care. Our goal is always to provide you with excellent care. Hearing back from our patients is one way we can continue to improve our services. Please take a few minutes to complete the written survey that you may receive in the mail after your visit with us. Thank you!             Your Updated Medication List - Protect others around you: Learn how to safely use, store and throw away your medicines at www.disposemymeds.org.          This list is accurate as of 6/26/18  7:37 PM.  Always use your most recent med list.                   Brand Name Dispense " Instructions for use Diagnosis    PRENATAL VITAMIN PO      Take 1 tablet by mouth daily    Pregnancy confirmed by positive urine test

## 2018-06-27 ASSESSMENT — ANXIETY QUESTIONNAIRES: GAD7 TOTAL SCORE: 1

## 2018-06-27 ASSESSMENT — PATIENT HEALTH QUESTIONNAIRE - PHQ9: SUM OF ALL RESPONSES TO PHQ QUESTIONS 1-9: 0

## 2018-06-27 NOTE — PROGRESS NOTES
19w3d   No complaints.  Starting to feel FM.    Growth US showed normal fetal anatomy. BOY!  Several moderate sized fibroids noted.   We discussed dx of fibroids, looked at US pics together.  Four cm fibroid is retroplacental so will get growth US's monthly.    discussed GCT at 24 wks.  NATALIIA

## 2018-07-18 ENCOUNTER — RADIANT APPOINTMENT (OUTPATIENT)
Dept: ULTRASOUND IMAGING | Facility: CLINIC | Age: 33
End: 2018-07-18
Attending: OBSTETRICS & GYNECOLOGY
Payer: COMMERCIAL

## 2018-07-18 PROCEDURE — 76816 OB US FOLLOW-UP PER FETUS: CPT | Performed by: OBSTETRICS & GYNECOLOGY

## 2018-07-26 ENCOUNTER — PRENATAL OFFICE VISIT (OUTPATIENT)
Dept: OBGYN | Facility: CLINIC | Age: 33
End: 2018-07-26
Payer: COMMERCIAL

## 2018-07-26 VITALS
BODY MASS INDEX: 29.84 KG/M2 | WEIGHT: 174.8 LBS | HEART RATE: 60 BPM | DIASTOLIC BLOOD PRESSURE: 65 MMHG | SYSTOLIC BLOOD PRESSURE: 104 MMHG | HEIGHT: 64 IN | TEMPERATURE: 98.5 F | OXYGEN SATURATION: 100 %

## 2018-07-26 DIAGNOSIS — D25.9 UTERINE FIBROID IN PREGNANCY: ICD-10-CM

## 2018-07-26 DIAGNOSIS — O34.10 UTERINE FIBROID IN PREGNANCY: ICD-10-CM

## 2018-07-26 DIAGNOSIS — Z34.02 NORMAL FIRST PREGNANCY IN SECOND TRIMESTER: Primary | ICD-10-CM

## 2018-07-26 PROCEDURE — 99207 ZZC PRENATAL VISIT: CPT | Performed by: OBSTETRICS & GYNECOLOGY

## 2018-07-26 NOTE — MR AVS SNAPSHOT
After Visit Summary   7/26/2018    Rashawn Jones    MRN: [id number]           Patient Information     Date Of Birth          1985        Visit Information        Provider Department      7/26/2018 11:15 AM Laine Boudreaux MD [facility]        Today's Diagnoses     Normal first pregnancy in second trimester    -  1    Uterine fibroid in pregnancy           Follow-ups after your visit        Your next 10 appointments already scheduled     Aug 03, 2018  7:45 AM CDT   LAB with NE LAB   [facility] ([facility])    [address] [place] [id number] [phone]           Please do not eat 10-12 hours before your appointment if you are coming in fasting for labs on lipids, cholesterol, or glucose (sugar). This does not apply to pregnant women. Water, hot tea and black coffee (with nothing added) are okay. Do not drink other fluids, diet soda or chew gum.              Future tests that were ordered for you today     Open Future Orders        Priority Expected Expires Ordered    Glucose tolerance, gest screen, 1 hour Routine  7/26/2019 7/26/2018    Treponema Abs w Reflex to RPR and Titer Routine  7/26/2019 7/26/2018    OB hemoglobin Routine  7/26/2019 7/26/2018            Who to contact     If you have questions or need follow up information about today's clinic visit or your schedule please contact [facility] directly at [phone].  Normal or non-critical lab and imaging results will be communicated to you by MyChart, letter or phone within 4 business days after the clinic has received the results. If you do not hear from us within 7 days, please contact the clinic through MyChart or phone. If you have a critical or abnormal lab result, we will notify you by phone as soon as possible.  Submit refill requests through Connect2me or call your pharmacy and they will forward the refill request to us. Please allow 3  "business days for your refill to be completed.          Additional Information About Your Visit        MyChart Information     [employer] gives you secure access to your electronic health record. If you see a primary care provider, you can also send messages to your care team and make appointments. If you have questions, please call your primary care clinic.  If you do not have a primary care provider, please call [phone] and they will assist you.        Care EveryWhere ID     This is your Care EveryWhere ID. This could be used by other organizations to access your [place] medical records  [id number]        Your Vitals Were     Pulse Temperature Height Last Period Pulse Oximetry BMI (Body Mass Index)    60 98.5  F (36.9  C) (Oral) 5' 4.14\" (1.629 m) 02/10/2018 100% 29.87 kg/m2       Blood Pressure from Last 3 Encounters:   07/26/18 104/65   06/26/18 113/69   06/04/18 124/69    Weight from Last 3 Encounters:   07/26/18 174 lb 12.8 oz (79.3 kg)   06/26/18 170 lb 9.6 oz (77.4 kg)   06/04/18 162 lb 12.8 oz (73.8 kg)               Primary Care Provider Office Phone # Fax #    [facility] [phone]       [address] [place] [address]        Equal Access to Services     RUTHIE THAYER AH: Hadii loraine ku hadasho Soomaali, waaxda luqadaha, qaybta kaalmada adeegyada, tip de la cruz. So [facility] [phone].    ATENCIÓN: Si habla español, tiene a kirkpatrick disposición servicios gratuitos de asistencia lingüística. Llame al [phone].    We comply with applicable federal civil rights laws and [place] laws. We do not discriminate on the basis of race, color, national origin, age, disability, sex, sexual orientation, or gender identity.            Thank you!     Thank you for choosing [facility]  for your care. Our goal is always to provide you with excellent care. Hearing back from our patients is one way we can continue to improve our " services. Please take a few minutes to complete the written survey that you may receive in the mail after your visit with us. Thank you!             Your Updated Medication List - Protect others around you: Learn how to safely use, store and throw away your medicines at www.disposemymeds.org.          This list is accurate as of 7/26/18  2:53 PM.  Always use your most recent med list.                   Brand Name Dispense Instructions for use Diagnosis    PRENATAL VITAMIN PO      Take 1 tablet by mouth daily    Pregnancy confirmed by positive urine test       VITAMIN D (CHOLECALCIFEROL) PO      Take 2,000 Units by mouth daily

## 2018-07-26 NOTE — PROGRESS NOTES
23w5d  No complaints.  Baby is moving well.  Repeat US last week was normal.   Fibroid is not growing. Will come back next week for her GCT.

## 2018-08-03 DIAGNOSIS — Z34.02 NORMAL FIRST PREGNANCY IN SECOND TRIMESTER: ICD-10-CM

## 2018-08-03 LAB
GLUCOSE 1H P 50 G GLC PO SERPL-MCNC: 170 MG/DL (ref 60–129)
HGB BLD-MCNC: 10.1 G/DL (ref 11.7–15.7)
T PALLIDUM AB SER QL: NONREACTIVE

## 2018-08-03 PROCEDURE — 86780 TREPONEMA PALLIDUM: CPT | Performed by: OBSTETRICS & GYNECOLOGY

## 2018-08-03 PROCEDURE — 36415 COLL VENOUS BLD VENIPUNCTURE: CPT | Performed by: OBSTETRICS & GYNECOLOGY

## 2018-08-03 PROCEDURE — 00000218 ZZHCL STATISTIC OBHBG - HEMOGLOBIN: Performed by: OBSTETRICS & GYNECOLOGY

## 2018-08-03 PROCEDURE — 82950 GLUCOSE TEST: CPT | Performed by: OBSTETRICS & GYNECOLOGY

## 2018-08-14 ENCOUNTER — PRENATAL OFFICE VISIT (OUTPATIENT)
Dept: OBGYN | Facility: CLINIC | Age: 33
End: 2018-08-14
Attending: OBSTETRICS & GYNECOLOGY
Payer: COMMERCIAL

## 2018-08-14 ENCOUNTER — RADIANT APPOINTMENT (OUTPATIENT)
Dept: ULTRASOUND IMAGING | Facility: CLINIC | Age: 33
End: 2018-08-14
Attending: OBSTETRICS & GYNECOLOGY
Payer: COMMERCIAL

## 2018-08-14 VITALS
SYSTOLIC BLOOD PRESSURE: 115 MMHG | WEIGHT: 178 LBS | DIASTOLIC BLOOD PRESSURE: 71 MMHG | BODY MASS INDEX: 30.42 KG/M2 | HEART RATE: 76 BPM

## 2018-08-14 DIAGNOSIS — D50.9 IRON DEFICIENCY ANEMIA, UNSPECIFIED IRON DEFICIENCY ANEMIA TYPE: ICD-10-CM

## 2018-08-14 DIAGNOSIS — Z34.02 NORMAL FIRST PREGNANCY IN SECOND TRIMESTER: Primary | ICD-10-CM

## 2018-08-14 DIAGNOSIS — R73.09 ELEVATED GLUCOSE TOLERANCE TEST: ICD-10-CM

## 2018-08-14 DIAGNOSIS — Z34.02 NORMAL FIRST PREGNANCY IN SECOND TRIMESTER: ICD-10-CM

## 2018-08-14 PROCEDURE — 99207 ZZC PRENATAL VISIT: CPT | Performed by: OBSTETRICS & GYNECOLOGY

## 2018-08-14 PROCEDURE — 76816 OB US FOLLOW-UP PER FETUS: CPT | Performed by: OBSTETRICS & GYNECOLOGY

## 2018-08-14 NOTE — PROGRESS NOTES
26w3d  US today shows fibroid is 4.8x4.1x3.8cm  EFW 945g (2lb 1oz), 53%.   Feeling fetal movement. No cramping or pain. No bleeding or leaking.   Childbirth classes? Thinking about it  Plan on breastfeeding? Yes  Birthcontrol? Undecided, has only used condoms, discussed other methods including LARCs, will think about it  Gender on ultrasound? boy  Circumsion? Yes  Peds doc? Undecided, considering options in [place] versus [facility]  1h gtt 170, Hgb 10.1. Discussed elevated 1h gtt, needs to schedule 3h. Discussed anemia and iron supplementation.   RTC 2 weeks.  Joyce Desai MD

## 2018-08-14 NOTE — MR AVS SNAPSHOT
After Visit Summary   8/14/2018    Rashawn Jones    MRN: [id number]           Patient Information     Date Of Birth          1985        Visit Information        Provider Department      8/14/2018 9:30 AM Joyce Desai MD [facility]        Today's Diagnoses     Normal first pregnancy in second trimester    -  1    Elevated glucose tolerance test        Iron deficiency anemia, unspecified iron deficiency anemia type          Care Instructions    Schedule 3 hour glucose test  To schedule a hospital tour, please call [phone]          Follow-ups after your visit        Follow-up notes from your care team     Return in about 2 weeks (around 8/28/2018).      Who to contact     If you have questions or need follow up information about today's clinic visit or your schedule please contact [facility] directly at [phone].  Normal or non-critical lab and imaging results will be communicated to you by Zurrbahart, letter or phone within 4 business days after the clinic has received the results. If you do not hear from us within 7 days, please contact the clinic through Zurrbahart or phone. If you have a critical or abnormal lab result, we will notify you by phone as soon as possible.  Submit refill requests through [employer] or call your pharmacy and they will forward the refill request to us. Please allow 3 business days for your refill to be completed.          Additional Information About Your Visit        MyChart Information     [employer] gives you secure access to your electronic health record. If you see a primary care provider, you can also send messages to your care team and make appointments. If you have questions, please call your primary care clinic.  If you do not have a primary care provider, please call [phone] and they will assist you.        Care EveryWhere ID     This is your Care EveryWhere ID. This could be used by other organizations to  access your [place] medical records  [id number]        Your Vitals Were     Pulse Last Period BMI (Body Mass Index)             76 02/10/2018 30.42 kg/m2          Blood Pressure from Last 3 Encounters:   08/14/18 115/71   07/26/18 104/65   06/26/18 113/69    Weight from Last 3 Encounters:   08/14/18 178 lb (80.7 kg)   07/26/18 174 lb 12.8 oz (79.3 kg)   06/26/18 170 lb 9.6 oz (77.4 kg)              Today, you had the following     No orders found for display       Primary Care Provider Office Phone # Fax #    [facility] [phone]       [address] [facility] [address]        Equal Access to Services     RUTHIE THAYER : Libia blanca Soestelle, waaxda luqadaha, qaybta kaalmada adeegyada, tip de lac ruz. So [facility] [phone].    ATENCIÓN: Si habla español, tiene a kirkpatrick disposición servicios gratuitos de asistencia lingüística. Llame al [phone].    We comply with applicable federal civil rights laws and [place] laws. We do not discriminate on the basis of race, color, national origin, age, disability, sex, sexual orientation, or gender identity.            Thank you!     Thank you for choosing [facility]  for your care. Our goal is always to provide you with excellent care. Hearing back from our patients is one way we can continue to improve our services. Please take a few minutes to complete the written survey that you may receive in the mail after your visit with us. Thank you!             Your Updated Medication List - Protect others around you: Learn how to safely use, store and throw away your medicines at www.disposemymeds.org.          This list is accurate as of 8/14/18  9:43 AM.  Always use your most recent med list.                   Brand Name Dispense Instructions for use Diagnosis    PRENATAL VITAMIN PO      Take 1 tablet by mouth daily    Pregnancy confirmed by positive urine test       VITAMIN D  (CHOLECALCIFEROL) PO      Take 2,000 Units by mouth daily

## 2018-08-27 DIAGNOSIS — Z34.02 NORMAL FIRST PREGNANCY IN SECOND TRIMESTER: ICD-10-CM

## 2018-08-27 PROCEDURE — 82951 GLUCOSE TOLERANCE TEST (GTT): CPT | Performed by: OBSTETRICS & GYNECOLOGY

## 2018-08-27 PROCEDURE — 36415 COLL VENOUS BLD VENIPUNCTURE: CPT | Performed by: OBSTETRICS & GYNECOLOGY

## 2018-08-27 PROCEDURE — 82952 GTT-ADDED SAMPLES: CPT | Performed by: OBSTETRICS & GYNECOLOGY

## 2018-08-28 LAB
GLUCOSE 1H P 100 G GLC PO SERPL-MCNC: 167 MG/DL (ref 60–179)
GLUCOSE 2H P 100 G GLC PO SERPL-MCNC: 134 MG/DL (ref 60–154)
GLUCOSE 3H P 100 G GLC PO SERPL-MCNC: 104 MG/DL (ref 60–139)
GLUCOSE P FAST SERPL-MCNC: 84 MG/DL (ref 60–94)

## 2018-09-06 ENCOUNTER — PRENATAL OFFICE VISIT (OUTPATIENT)
Dept: OBGYN | Facility: CLINIC | Age: 33
End: 2018-09-06
Payer: COMMERCIAL

## 2018-09-06 VITALS
HEART RATE: 67 BPM | DIASTOLIC BLOOD PRESSURE: 63 MMHG | BODY MASS INDEX: 31.21 KG/M2 | HEIGHT: 64 IN | WEIGHT: 182.8 LBS | TEMPERATURE: 98.1 F | SYSTOLIC BLOOD PRESSURE: 121 MMHG

## 2018-09-06 DIAGNOSIS — O34.10 UTERINE FIBROID IN PREGNANCY: ICD-10-CM

## 2018-09-06 DIAGNOSIS — D25.9 UTERINE FIBROID IN PREGNANCY: ICD-10-CM

## 2018-09-06 DIAGNOSIS — Z23 NEED FOR TDAP VACCINATION: ICD-10-CM

## 2018-09-06 DIAGNOSIS — Z34.03 NORMAL FIRST PREGNANCY IN THIRD TRIMESTER: Primary | ICD-10-CM

## 2018-09-06 PROCEDURE — 90715 TDAP VACCINE 7 YRS/> IM: CPT | Performed by: OBSTETRICS & GYNECOLOGY

## 2018-09-06 PROCEDURE — 90471 IMMUNIZATION ADMIN: CPT | Performed by: OBSTETRICS & GYNECOLOGY

## 2018-09-06 PROCEDURE — 99207 ZZC PRENATAL VISIT: CPT | Performed by: OBSTETRICS & GYNECOLOGY

## 2018-09-06 NOTE — PROGRESS NOTES
29w5d   No complaints.  Baby is moving well.   Passed 3 hr GTT.  We reviewed glc intolerance and diet management.  She has already cut back on white rice.  Started eating brown rice.    We reviewed weight gain ~ 32 lbs so far.  She is walking about one hour daily.  Feels mainly needs to modify diet.   Patient will begin iron supplement for low hgb.   discussed fibroid has grown slightly but has not affected baby's growth.  Will get another growth US in ~ 6 wks.    tdap today.  RR

## 2018-09-06 NOTE — MR AVS SNAPSHOT
After Visit Summary   9/6/2018    Rashawn Jones    MRN: [id number]           Patient Information     Date Of Birth          1985        Visit Information        Provider Department      9/6/2018 11:15 AM Laine Boudreaux MD [facility]        Today's Diagnoses     Normal first pregnancy in third trimester    -  1    Need for Tdap vaccination        Uterine fibroid in pregnancy           Follow-ups after your visit        Your next 10 appointments already scheduled     Sep 20, 2018 10:30 AM CDT   ESTABLISHED PRENATAL with Laine Boudreaux MD   [facility] ([facility])    [address] [place] [address]   [phone]            Oct 09, 2018 10:30 AM CDT   ESTABLISHED PRENATAL with Laine Boudreaux MD   [facility] ([facility])    [address] [place] [address] [phone]              Future tests that were ordered for you today     Open Future Orders        Priority Expected Expires Ordered    US OB > 14 Weeks Complete Single Routine  9/6/2019 9/6/2018            Who to contact     If you have questions or need follow up information about today's clinic visit or your schedule please contact [facility] directly at [phone].  Normal or non-critical lab and imaging results will be communicated to you by MyChart, letter or phone within 4 business days after the clinic has received the results. If you do not hear from us within 7 days, please contact the clinic through Procl[employer] System[employer]art or phone. If you have a critical or abnormal lab result, we will notify you by phone as soon as possible.  Submit refill requests through [employer] or call your pharmacy and they will forward the refill request to us. Please allow 3 business days for your refill to be completed.          Additional Information About Your Visit        Proclivity Systemshart Information     [employer] gives  "you secure access to your electronic health record. If you see a primary care provider, you can also send messages to your care team and make appointments. If you have questions, please call your primary care clinic.  If you do not have a primary care provider, please call [phone] and they will assist you.        Care EveryWhere ID     This is your Care EveryWhere ID. This could be used by other organizations to access your [place] medical records  [id number]        Your Vitals Were     Pulse Temperature Height Last Period BMI (Body Mass Index)       67 98.1  F (36.7  C) (Oral) 5' 4.14\" (1.629 m) 02/10/2018 31.24 kg/m2        Blood Pressure from Last 3 Encounters:   09/06/18 121/63   08/14/18 115/71   07/26/18 104/65    Weight from Last 3 Encounters:   09/06/18 182 lb 12.8 oz (82.9 kg)   08/14/18 178 lb (80.7 kg)   07/26/18 174 lb 12.8 oz (79.3 kg)              We Performed the Following     TDAP VACCINE (BOOSTRIX)     VACCINE ADMINISTRATION, INITIAL        Primary Care Provider Office Phone # Fax #    [facility] [phone]       [address] [place], [place] [id number]        Equal Access to Services     RUTHIE THAYER AH: Hadii loraine ku hadasho Soomaali, waaxda luqadaha, qaybta kaalmada adeegyada, waxay idiin hayhenriquen alexander de la cruz. So [facility] [phone].    ATENCIÓN: Si habla español, tiene a kirkpatrick disposición servicios gratuitos de asistencia lingüística. Llame al [phone].    We comply with applicable federal civil rights laws and [place] laws. We do not discriminate on the basis of race, color, national origin, age, disability, sex, sexual orientation, or gender identity.            Thank you!     Thank you for choosing [facility]  for your care. Our goal is always to provide you with excellent care. Hearing back from our patients is one way we can continue to improve our services. Please take a few minutes to complete the written survey that " you may receive in the mail after your visit with us. Thank you!             Your Updated Medication List - Protect others around you: Learn how to safely use, store and throw away your medicines at www.disposemymeds.org.          This list is accurate as of 9/6/18  1:58 PM.  Always use your most recent med list.                   Brand Name Dispense Instructions for use Diagnosis    IRON SUPPLEMENT PO      Take 18 mg by mouth        PRENATAL VITAMIN PO      Take 1 tablet by mouth daily    Pregnancy confirmed by positive urine test       VITAMIN D (CHOLECALCIFEROL) PO      Take 2,000 Units by mouth daily

## 2018-09-20 ENCOUNTER — PRENATAL OFFICE VISIT (OUTPATIENT)
Dept: OBGYN | Facility: CLINIC | Age: 33
End: 2018-09-20
Payer: COMMERCIAL

## 2018-09-20 ENCOUNTER — TELEPHONE (OUTPATIENT)
Dept: OBGYN | Facility: CLINIC | Age: 33
End: 2018-09-20

## 2018-09-20 VITALS
TEMPERATURE: 97.8 F | BODY MASS INDEX: 31.14 KG/M2 | SYSTOLIC BLOOD PRESSURE: 121 MMHG | DIASTOLIC BLOOD PRESSURE: 65 MMHG | HEART RATE: 78 BPM | OXYGEN SATURATION: 99 % | WEIGHT: 182.4 LBS | HEIGHT: 64 IN

## 2018-09-20 DIAGNOSIS — Z34.03 NORMAL FIRST PREGNANCY IN THIRD TRIMESTER: Primary | ICD-10-CM

## 2018-09-20 PROCEDURE — 99207 ZZC PRENATAL VISIT: CPT | Performed by: OBSTETRICS & GYNECOLOGY

## 2018-09-20 NOTE — TELEPHONE ENCOUNTER
Rahsawn called and is asking for an order for a breast pump to be faxed to the [employer] at [phone]. She would also like to be called when this is done. She can be reached at [phone]. Ok to wait until Friday.

## 2018-09-20 NOTE — PROGRESS NOTES
31w5d  No complaints.  Has been watching her diet and exercising more.  Reviewed weight gain.  None since last visit.   Has been walking daily.  Baby is moving well.  Took CB classes and a tour. We reviewed pain meds in labor. Interested in nitrous and epidural. tdap done at last visit.   RTC in 2 wks.  RR

## 2018-09-20 NOTE — MR AVS SNAPSHOT
After Visit Summary   9/20/2018    Rashawn Jones    MRN: [id number]           Patient Information     Date Of Birth          1985        Visit Information        Provider Department      9/20/2018 10:30 AM Laine Boudreaux MD [facility]        Today's Diagnoses     Normal first pregnancy in third trimester    -  1       Follow-ups after your visit        Your next 10 appointments already scheduled     Sep 24, 2018  7:00 AM CDT   US OB > 14 WEEKS COMPLETE SINGLE with RDUS1   [facility] ([facility])    [address] [place] [address]  [address] [place] [address] [phone]           How do I prepare for my exam? (Food and drink instructions) Drink four 8-ounce glasses of fluid an hour before your exam. If you need to empty your bladder before your exam, try to release only a little urine. Then, drink another glass of fluid.  How do I prepare for my exam? (Other instructions) You may have up to two family members in the exam room. If you bring a small child, an adult must be there to care for him or her. No video or camera photography during the procedure.  What should I wear: Wear comfortable clothes.  How long does the exam take: Most ultrasounds take 30 to 60 minutes.  What should I bring: Bring a list of your medicines, including vitamins, minerals and over-the-counter drugs. It is safest to leave personal items at home.  Do I need a :  No  is needed.  What do I need to tell my doctor: Tell your doctor about any allergies you may have.  What should I do after the exam: No restrictions, You may resume normal activities.  What is this test: An ultrasound uses sound waves to make pictures of the body. Sound waves do not cause pain. The only discomfort may be the pressure of the wand against your skin or full bladder.  Who should I call with questions: If you have any questions, please call the Imaging Department where you will have your  "exam. Directions, parking instructions, and other information is available on our website, [email or web address].            Oct 09, 2018 10:30 AM CDT   ESTABLISHED PRENATAL with Laine Boudreaux MD   [facility]    [address] [place] [address] [phone]              Who to contact     If you have questions or need follow up information about today's clinic visit or your schedule please contact [facility] directly at [phone].  Normal or non-critical lab and imaging results will be communicated to you by [employer]umehart, letter or phone within 4 business days after the clinic has received the results. If you do not hear from us within 7 days, please contact the clinic through Envoyt or phone. If you have a critical or abnormal lab result, we will notify you by phone as soon as possible.  Submit refill requests through [employer] or call your pharmacy and they will forward the refill request to us. Please allow 3 business days for your refill to be completed.          Additional Information About Your Visit        P[employer]umehart Information     [employer] gives you secure access to your electronic health record. If you see a primary care provider, you can also send messages to your care team and make appointments. If you have questions, please call your primary care clinic.  If you do not have a primary care provider, please call [phone] and they will assist you.        Care EveryWhere ID     This is your Care EveryWhere ID. This could be used by other organizations to access your [place] medical records  [id number]        Your Vitals Were     Pulse Temperature Height Last Period Pulse Oximetry BMI (Body Mass Index)    78 97.8  F (36.6  C) (Oral) 5' 4.14\" (1.629 m) 02/10/2018 99% 31.17 kg/m2       Blood Pressure from Last 3 Encounters:   09/20/18 121/65   09/06/18 121/63   08/14/18 115/71    Weight from Last 3 Encounters:   09/20/18 " 182 lb 6.4 oz (82.7 kg)   09/06/18 182 lb 12.8 oz (82.9 kg)   08/14/18 178 lb (80.7 kg)              Today, you had the following     No orders found for display       Primary Care Provider Office Phone # Fax #    [facility] [phone]       [address] [facility] [address]        Equal Access to Services     RUTHIE THAYER : Hadii aad ku hadasho Soomaali, waaxda luqadaha, qaybta kaalmada adeegyada, waxay jeremiein hayaan adedarrell solismaciewali de la cruz. So [facility] [phone].    ATENCIÓN: Si habla español, tiene a kirkpatrick disposición servicios gratuitos de asistencia lingüística. Llame al [phone].    We comply with applicable federal civil rights laws and [place] laws. We do not discriminate on the basis of race, color, national origin, age, disability, sex, sexual orientation, or gender identity.            Thank you!     Thank you for choosing [facility]  for your care. Our goal is always to provide you with excellent care. Hearing back from our patients is one way we can continue to improve our services. Please take a few minutes to complete the written survey that you may receive in the mail after your visit with us. Thank you!             Your Updated Medication List - Protect others around you: Learn how to safely use, store and throw away your medicines at www.disposemymeds.org.          This list is accurate as of 9/20/18 12:20 PM.  Always use your most recent med list.                   Brand Name Dispense Instructions for use Diagnosis    IRON SUPPLEMENT PO      Take 18 mg by mouth        PRENATAL VITAMIN PO      Take 1 tablet by mouth daily    Pregnancy confirmed by positive urine test       VITAMIN D (CHOLECALCIFEROL) PO      Take 2,000 Units by mouth daily

## 2018-09-21 RX ORDER — BREAST PUMP
1 EACH MISCELLANEOUS PRN
Qty: 1 EACH | Refills: 0 | Status: SHIPPED | OUTPATIENT
Start: 2018-09-21 | End: 2018-10-09

## 2018-09-24 ENCOUNTER — RADIANT APPOINTMENT (OUTPATIENT)
Dept: ULTRASOUND IMAGING | Facility: CLINIC | Age: 33
End: 2018-09-24
Attending: OBSTETRICS & GYNECOLOGY
Payer: COMMERCIAL

## 2018-09-24 DIAGNOSIS — O34.10 UTERINE FIBROID IN PREGNANCY: ICD-10-CM

## 2018-09-24 DIAGNOSIS — D25.9 UTERINE FIBROID IN PREGNANCY: ICD-10-CM

## 2018-09-24 DIAGNOSIS — Z34.03 NORMAL FIRST PREGNANCY IN THIRD TRIMESTER: ICD-10-CM

## 2018-09-24 PROCEDURE — 76816 OB US FOLLOW-UP PER FETUS: CPT | Performed by: OBSTETRICS & GYNECOLOGY

## 2018-10-09 ENCOUNTER — PRENATAL OFFICE VISIT (OUTPATIENT)
Dept: OBGYN | Facility: CLINIC | Age: 33
End: 2018-10-09
Payer: COMMERCIAL

## 2018-10-09 VITALS
OXYGEN SATURATION: 99 % | SYSTOLIC BLOOD PRESSURE: 121 MMHG | DIASTOLIC BLOOD PRESSURE: 72 MMHG | HEIGHT: 64 IN | HEART RATE: 71 BPM | WEIGHT: 191.8 LBS | BODY MASS INDEX: 32.74 KG/M2 | TEMPERATURE: 98.3 F

## 2018-10-09 DIAGNOSIS — Z34.03 NORMAL FIRST PREGNANCY IN THIRD TRIMESTER: Primary | ICD-10-CM

## 2018-10-09 PROBLEM — Z34.02 NORMAL FIRST PREGNANCY IN SECOND TRIMESTER: Status: RESOLVED | Noted: 2018-06-04 | Resolved: 2018-10-09

## 2018-10-09 PROCEDURE — 99207 ZZC PRENATAL VISIT: CPT | Performed by: OBSTETRICS & GYNECOLOGY

## 2018-10-09 NOTE — PROGRESS NOTES
34w3d  Hands and face look swollen today.  discussed diet and she ate soy sauce last night.   Feeling swollen today.  Weight went up 9 lbs since last visit.    discussed diet and length and foods to reverse swelling from high sodium diets.   discussed GBS at [place].  Baby is moving well.  occ mild B-H ctx's.  Overall doing well except for weight gain.   discussed exercise and activity.  RTC 2 wks.   RR

## 2018-10-09 NOTE — MR AVS SNAPSHOT
"              After Visit Summary   10/9/2018    Rashawn Jones    MRN: [id number]           Patient Information     Date Of Birth          1985        Visit Information        Provider Department      10/9/2018 10:30 AM Laine Boudreaux MD [facility]        Today's Diagnoses     Normal first pregnancy in third trimester    -  1       Follow-ups after your visit        Who to contact     If you have questions or need follow up information about today's clinic visit or your schedule please contact [facility] directly at [phone].  Normal or non-critical lab and imaging results will be communicated to you by [employer]hart, letter or phone within 4 business days after the clinic has received the results. If you do not hear from us within 7 days, please contact the clinic through mascotsecrett or phone. If you have a critical or abnormal lab result, we will notify you by phone as soon as possible.  Submit refill requests through [employer] or call your pharmacy and they will forward the refill request to us. Please allow 3 business days for your refill to be completed.          Additional Information About Your Visit        MyChart Information     [employer] gives you secure access to your electronic health record. If you see a primary care provider, you can also send messages to your care team and make appointments. If you have questions, please call your primary care clinic.  If you do not have a primary care provider, please call [phone] and they will assist you.        Care EveryWhere ID     This is your Care EveryWhere ID. This could be used by other organizations to access your [place] medical records  [id number]        Your Vitals Were     Pulse Temperature Height Last Period Pulse Oximetry BMI (Body Mass Index)    71 98.3  F (36.8  C) (Oral) 5' 4.14\" (1.629 m) 02/10/2018 99% 32.78 kg/m2       Blood Pressure from Last 3 Encounters:   10/09/18 121/72   09/20/18 121/65 "   09/06/18 121/63    Weight from Last 3 Encounters:   10/09/18 191 lb 12.8 oz (87 kg)   09/20/18 182 lb 6.4 oz (82.7 kg)   09/06/18 182 lb 12.8 oz (82.9 kg)              Today, you had the following     No orders found for display         Today's Medication Changes          These changes are accurate as of 10/9/18 11:19 AM.  If you have any questions, ask your nurse or doctor.               Stop taking these medicines if you haven't already. Please contact your care team if you have questions.     breast pump Misc   Stopped by:  Laine Boudreaux MD                    Primary Care Provider Office Phone # Fax #    [facility] [phone]       [address] [place] [address]        Equal Access to Services     RUTHIE THAYER : Libia Betancourt, waaxerin luqadaha, qaybnadia kaalmaerin mendenhall, tip de la cruz. So [facility] [phone].    ATENCIÓN: Si habla español, tiene a kirkpatrick disposición servicios gratuitos de asistencia lingüística. Ronaldo al [phone].    We comply with applicable federal civil rights laws and [place] laws. We do not discriminate on the basis of race, color, national origin, age, disability, sex, sexual orientation, or gender identity.            Thank you!     Thank you for choosing [facility]  for your care. Our goal is always to provide you with excellent care. Hearing back from our patients is one way we can continue to improve our services. Please take a few minutes to complete the written survey that you may receive in the mail after your visit with us. Thank you!             Your Updated Medication List - Protect others around you: Learn how to safely use, store and throw away your medicines at www.disposemymeds.org.          This list is accurate as of 10/9/18 11:19 AM.  Always use your most recent med list.                   Brand Name Dispense Instructions for use Diagnosis    IRON SUPPLEMENT  PO      Take 18 mg by mouth        PRENATAL VITAMIN PO      Take 1 tablet by mouth daily    Pregnancy confirmed by positive urine test       VITAMIN D (CHOLECALCIFEROL) PO      Take 2,000 Units by mouth daily

## 2018-10-23 ENCOUNTER — PRENATAL OFFICE VISIT (OUTPATIENT)
Dept: OBGYN | Facility: CLINIC | Age: 33
End: 2018-10-23
Payer: COMMERCIAL

## 2018-10-23 VITALS
WEIGHT: 191.4 LBS | SYSTOLIC BLOOD PRESSURE: 115 MMHG | HEART RATE: 66 BPM | TEMPERATURE: 98.1 F | DIASTOLIC BLOOD PRESSURE: 70 MMHG | BODY MASS INDEX: 29.01 KG/M2 | OXYGEN SATURATION: 100 % | HEIGHT: 68 IN

## 2018-10-23 DIAGNOSIS — Z34.03 NORMAL FIRST PREGNANCY IN THIRD TRIMESTER: Primary | ICD-10-CM

## 2018-10-23 PROCEDURE — 87186 SC STD MICRODIL/AGAR DIL: CPT | Performed by: OBSTETRICS & GYNECOLOGY

## 2018-10-23 PROCEDURE — 87653 STREP B DNA AMP PROBE: CPT | Performed by: OBSTETRICS & GYNECOLOGY

## 2018-10-23 PROCEDURE — 99207 ZZC PRENATAL VISIT: CPT | Performed by: OBSTETRICS & GYNECOLOGY

## 2018-10-23 ASSESSMENT — ANXIETY QUESTIONNAIRES
6. BECOMING EASILY ANNOYED OR IRRITABLE: NOT AT ALL
2. NOT BEING ABLE TO STOP OR CONTROL WORRYING: NOT AT ALL
7. FEELING AFRAID AS IF SOMETHING AWFUL MIGHT HAPPEN: NOT AT ALL
GAD7 TOTAL SCORE: 1
5. BEING SO RESTLESS THAT IT IS HARD TO SIT STILL: NOT AT ALL
1. FEELING NERVOUS, ANXIOUS, OR ON EDGE: NOT AT ALL
IF YOU CHECKED OFF ANY PROBLEMS ON THIS QUESTIONNAIRE, HOW DIFFICULT HAVE THESE PROBLEMS MADE IT FOR YOU TO DO YOUR WORK, TAKE CARE OF THINGS AT HOME, OR GET ALONG WITH OTHER PEOPLE: NOT DIFFICULT AT ALL
3. WORRYING TOO MUCH ABOUT DIFFERENT THINGS: SEVERAL DAYS

## 2018-10-23 ASSESSMENT — PATIENT HEALTH QUESTIONNAIRE - PHQ9: 5. POOR APPETITE OR OVEREATING: NOT AT ALL

## 2018-10-23 NOTE — PROGRESS NOTES
36w3d  Feeling lots of ctx's at night.  Not painful but definitely noticeable.   Baby is moving well.  discussed pain meds in labor at length today.  Also discussed sling, birthing ball.    Cervix closed but soft.  GBS done today.  Left without getting hgb so will do next week.    Reviewed weight gain.  RR

## 2018-10-23 NOTE — MR AVS SNAPSHOT
After Visit Summary   10/23/2018    Rashawn Jones    MRN: [id number]           Patient Information     Date Of Birth          1985        Visit Information        Provider Department      10/23/2018 10:30 AM Laine Boudreaux MD [facility]        Today's Diagnoses     Normal first pregnancy in third trimester    -  1       Follow-ups after your visit        Your next 10 appointments already scheduled     Oct 30, 2018 11:15 AM CDT   ESTABLISHED PRENATAL with Laine Boudreaux MD   [facility] ([address] [address]   [phone]            Nov 06, 2018 11:15 AM CST   ESTABLISHED PRENATAL with Laine Boudreaux MD   [facility] ([address] [id number] [phone]            Nov 13, 2018  9:15 AM CST   ESTABLISHED PRENATAL with Laine Boudreaux MD   [facility] ([address] [address] [phone]              Who to contact     If you have questions or need follow up information about today's clinic visit or your schedule please contact [facility] [place] [facility] directly at [phone].  Normal or non-critical lab and imaging results will be communicated to you by MyChart, letter or phone within 4 business days after the clinic has received the results. If you do not hear from us within 7 days, please contact the clinic through World[employer] Develop[employer]hart or phone. If you have a critical or abnormal lab result, we will notify you by phone as soon as possible.  Submit refill requests through OpenDrive or call your pharmacy and they will forward the refill request to us. Please allow 3 business days for your refill to be completed.          Additional Information About Your Visit        OpenDrive Information     OpenDrive gives you secure access to your  "electronic health record. If you see a primary care provider, you can also send messages to your care team and make appointments. If you have questions, please call your primary care clinic.  If you do not have a primary care provider, please call [phone] and they will assist you.        Care EveryWhere ID     This is your Care EveryWhere ID. This could be used by other organizations to access your [place] medical records  [id number]        Your Vitals Were     Pulse Temperature Height Last Period Pulse Oximetry BMI (Body Mass Index)    66 98.1  F (36.7  C) (Oral) 5' 7.5\" (1.715 m) 02/10/2018 100% 29.54 kg/m2       Blood Pressure from Last 3 Encounters:   10/23/18 115/70   10/09/18 121/72   09/20/18 121/65    Weight from Last 3 Encounters:   10/23/18 191 lb 6.4 oz (86.8 kg)   10/09/18 191 lb 12.8 oz (87 kg)   09/20/18 182 lb 6.4 oz (82.7 kg)              We Performed the Following     Group B strep PCR        Primary Care Provider Office Phone # Fax #    [facility] [phone]       [address] [place] [address]        Equal Access to Services     RUTHIE THAYER AH: Hadii aad ku hadasho Soomaali, waaxda luqadaha, qaybta kaalmada adeegyada, waxay garry de la cruz. So [facility] [phone].    ATENCIÓN: Si habla español, tiene a kirkpatrick disposición servicios gratuitos de asistencia lingüística. Llame al [phone].    We comply with applicable federal civil rights laws and [place] laws. We do not discriminate on the basis of race, color, national origin, age, disability, sex, sexual orientation, or gender identity.            Thank you!     Thank you for choosing [facility]  for your care. Our goal is always to provide you with excellent care. Hearing back from our patients is one way we can continue to improve our services. Please take a few minutes to complete the written survey that you may receive in the mail after your visit with " us. Thank you!             Your Updated Medication List - Protect others around you: Learn how to safely use, store and throw away your medicines at www.disposemymeds.org.          This list is accurate as of 10/23/18  6:57 PM.  Always use your most recent med list.                   Brand Name Dispense Instructions for use Diagnosis    IRON SUPPLEMENT PO      Take 18 mg by mouth        PRENATAL VITAMIN PO      Take 1 tablet by mouth daily    Pregnancy confirmed by positive urine test       VITAMIN D (CHOLECALCIFEROL) PO      Take 2,000 Units by mouth daily

## 2018-10-24 LAB
GP B STREP DNA SPEC QL NAA+PROBE: POSITIVE
SPECIMEN SOURCE: ABNORMAL

## 2018-10-24 ASSESSMENT — PATIENT HEALTH QUESTIONNAIRE - PHQ9: SUM OF ALL RESPONSES TO PHQ QUESTIONS 1-9: 1

## 2018-10-24 ASSESSMENT — ANXIETY QUESTIONNAIRES: GAD7 TOTAL SCORE: 1

## 2018-10-29 LAB
BACTERIA SPEC CULT: ABNORMAL
SPECIMEN SOURCE: ABNORMAL

## 2018-10-30 ENCOUNTER — PRENATAL OFFICE VISIT (OUTPATIENT)
Dept: OBGYN | Facility: CLINIC | Age: 33
End: 2018-10-30
Payer: COMMERCIAL

## 2018-10-30 VITALS
WEIGHT: 193 LBS | HEART RATE: 83 BPM | BODY MASS INDEX: 29.25 KG/M2 | OXYGEN SATURATION: 98 % | SYSTOLIC BLOOD PRESSURE: 122 MMHG | HEIGHT: 68 IN | DIASTOLIC BLOOD PRESSURE: 82 MMHG

## 2018-10-30 DIAGNOSIS — Z34.03 NORMAL FIRST PREGNANCY IN THIRD TRIMESTER: Primary | ICD-10-CM

## 2018-10-30 PROCEDURE — 99207 ZZC PRENATAL VISIT: CPT | Performed by: OBSTETRICS & GYNECOLOGY

## 2018-10-30 NOTE — MR AVS SNAPSHOT
After Visit Summary   10/30/2018    Rashawn Jones    MRN: [id number]           Patient Information     Date Of Birth          1985        Visit Information        Provider Department      10/30/2018 11:15 AM Laine Boudreaux MD [facility]        Today's Diagnoses     Normal first pregnancy in third trimester    -  1       Follow-ups after your visit        Your next 10 appointments already scheduled     Nov 06, 2018 11:15 AM CST   ESTABL[facility]ATAL [facility]    [address] [place] [id number] [phone]            Nov 13, 2018  9:15 AM CST   ESTABLISHED PRENATAL with L[facility]    [address] [place] [id number] [phone]              Future tests that were ordered for you today     Open Future Orders        Priority Expected Expires Ordered    OB hemoglobin Routine  10/30/2019 10/30/2018            Who to contact     If you have questions or need follow up information about today's clinic visit or your schedule please contact [facility] directly at [phone].  Normal or non-critical lab and imaging results will be communicated to you by MyChart, letter or phone within 4 business days after the clinic has received the results. If you do not hear from us within 7 days, please contact the clinic through OpenSy[employer]hart or phone. If you have a critical or abnormal lab result, we will notify you by phone as soon as possible.  Submit refill requests through [employer] online [employer] and technology or call your pharmacy and they will forward the refill request to us. Please allow 3 business days for your refill to be completed.          Additional Information About Your Visit        MyChart Information     [employer] gives you secure access to your electronic health record. If you see a primary care  "provider, you can also send messages to your care team and make appointments. If you have questions, please call your primary care clinic.  If you do not have a primary care provider, please call [phone] and they will assist you.        Care EveryWhere ID     This is your Care EveryWhere ID. This could be used by other organizations to access your [place] medical records  [id number]        Your Vitals Were     Pulse Height Last Period Pulse Oximetry BMI (Body Mass Index)       83 5' 7.5\" (1.715 m) 02/10/2018 98% 29.78 kg/m2        Blood Pressure from Last 3 Encounters:   10/30/18 122/82   10/23/18 115/70   10/09/18 121/72    Weight from Last 3 Encounters:   10/30/18 193 lb (87.5 kg)   10/23/18 191 lb 6.4 oz (86.8 kg)   10/09/18 191 lb 12.8 oz (87 kg)               Primary Care Provider Office Phone # Fax #    [facility] [phone]       [address] [place], [place] [id number]        Equal Access to Services     RUTHIE THAYER AH: Hadii loraine ku hadasho Soomaali, waaxda luqadaha, qaybta kaalmada adeegyada, tip welch . So [facility] [phone].    ATENCIÓN: Si habla español, tiene a kirkpatrick disposición servicios gratuitos de asistencia lingüística. Llame al [phone].    We comply with applicable federal civil rights laws and [place] laws. We do not discriminate on the basis of race, color, national origin, age, disability, sex, sexual orientation, or gender identity.            Thank you!     Thank you for choosing [facility]  for your care. Our goal is always to provide you with excellent care. Hearing back from our patients is one way we can continue to improve our services. Please take a few minutes to complete the written survey that you may receive in the mail after your visit with us. Thank you!             Your Updated Medication List - Protect others around you: Learn how to safely use, store and throw away your medicines at " www.disposemymeds.org.          This list is accurate as of 10/30/18 11:59 AM.  Always use your most recent med list.                   Brand Name Dispense Instructions for use Diagnosis    IRON SUPPLEMENT PO      Take 18 mg by mouth        PRENATAL VITAMIN PO      Take 1 tablet by mouth daily    Pregnancy confirmed by positive urine test       VITAMIN D (CHOLECALCIFEROL) PO      Take 2,000 Units by mouth daily

## 2018-10-30 NOTE — PROGRESS NOTES
37w3d   No complaints.  Feeling occ ctx's.  Nothing painful.  Baby hiccups a lot.  Feels like baby has dropped on exam.    Spine to maternal left and head transverse. Flu shot done.    GBS positive.   RR

## 2018-11-13 ENCOUNTER — PRENATAL OFFICE VISIT (OUTPATIENT)
Dept: OBGYN | Facility: CLINIC | Age: 33
End: 2018-11-13
Payer: COMMERCIAL

## 2018-11-13 VITALS
OXYGEN SATURATION: 99 % | SYSTOLIC BLOOD PRESSURE: 111 MMHG | WEIGHT: 196.2 LBS | HEART RATE: 60 BPM | HEIGHT: 68 IN | DIASTOLIC BLOOD PRESSURE: 70 MMHG | BODY MASS INDEX: 29.73 KG/M2

## 2018-11-13 DIAGNOSIS — Z34.03 NORMAL FIRST PREGNANCY IN THIRD TRIMESTER: ICD-10-CM

## 2018-11-13 LAB — HGB BLD-MCNC: 11.4 G/DL (ref 11.7–15.7)

## 2018-11-13 PROCEDURE — 36416 COLLJ CAPILLARY BLOOD SPEC: CPT | Performed by: OBSTETRICS & GYNECOLOGY

## 2018-11-13 PROCEDURE — 99207 ZZC PRENATAL VISIT: CPT | Performed by: OBSTETRICS & GYNECOLOGY

## 2018-11-13 PROCEDURE — 00000218 ZZHCL STATISTIC OBHBG - HEMOGLOBIN: Performed by: OBSTETRICS & GYNECOLOGY

## 2018-11-13 NOTE — MR AVS SNAPSHOT
"              After Visit Summary   11/13/2018    Rashawn Jones    MRN: [id number]           Patient Information     Date Of Birth          1985        Visit Information        Provider Department      11/13/2018 9:15 AM Laine Boudreaux MD [facility]        Today's Diagnoses     Normal first pregnancy in third trimester           Follow-ups after your visit        Who to contact     If you have questions or need follow up information about today's clinic visit or your schedule please contact [facility] directly at [phone].  Normal or non-critical lab and imaging results will be communicated to you by [employer]hart, letter or phone within 4 business days after the clinic has received the results. If you do not hear from us within 7 days, please contact the clinic through OptuLinkt or phone. If you have a critical or abnormal lab result, we will notify you by phone as soon as possible.  Submit refill requests through Ever[employer]e or call your pharmacy and they will forward the refill request to us. Please allow 3 business days for your refill to be completed.          Additional Information About Your Visit        MyChart Information     Ever[employer] gives you secure access to your electronic health record. If you see a primary care provider, you can also send messages to your care team and make appointments. If you have questions, please call your primary care clinic.  If you do not have a primary care provider, please call [phone] and they will assist you.        Care EveryWhere ID     This is your Care EveryWhere ID. This could be used by other organizations to access your [place] medical records  [id number]        Your Vitals Were     Pulse Height Last Period Pulse Oximetry BMI (Body Mass Index)       60 5' 7.5\" (1.715 m) 02/10/2018 99% 30.28 kg/m2        Blood Pressure from Last 3 Encounters:   11/13/18 111/70   10/30/18 122/82   10/23/18 115/70    Weight from Last 3 " Encounters:   11/13/18 196 lb 3.2 oz (89 kg)   10/30/18 193 lb (87.5 kg)   10/23/18 191 lb 6.4 oz (86.8 kg)              We Performed the Following     OB hemoglobin        Primary Care Provider Office Phone # Fax #    [facility] [phone]       [address] [place] [address]        Equal Access to Services     RUTHIE THAYER : Hadii aad ku hadasho Soomaali, waaxda luqadaha, qaybta kaalmada adeegyada, waxay jeremiein hayaan adeeg willmaciewali de la cruz. So [facility] [phone].    ATENCIÓN: Si habla español, tiene a kirkpatrick disposición servicios gratuitos de asistencia lingüística. Llame al [phone].    We comply with applicable federal civil rights laws and [place] laws. We do not discriminate on the basis of race, color, national origin, age, disability, sex, sexual orientation, or gender identity.            Thank you!     Thank you for choosing [facility]  for your care. Our goal is always to provide you with excellent care. Hearing back from our patients is one way we can continue to improve our services. Please take a few minutes to complete the written survey that you may receive in the mail after your visit with us. Thank you!             Your Updated Medication List - Protect others around you: Learn how to safely use, store and throw away your medicines at www.disposemymeds.org.          This list is accurate as of 11/13/18  5:59 PM.  Always use your most recent med list.                   Brand Name Dispense Instructions for use Diagnosis    IRON SUPPLEMENT PO      Take 18 mg by mouth        PRENATAL VITAMIN PO      Take 1 tablet by mouth daily    Pregnancy confirmed by positive urine test       VITAMIN D (CHOLECALCIFEROL) PO      Take 2,000 Units by mouth daily

## 2018-11-13 NOTE — PROGRESS NOTES
"39w3d   Has noticed more swelling in her feet.  Other than that feeling well. No HA.   Baby is moving well. Rare ctx's.    discussed plan of care if she goes past her due date.  Reviewed IOL at 41 wks and normal expectation for induction.   Increased risk for  section reviewed, if cervix unfavorable at time of induction.    Reasons for doing  sections discussed and her only plan is to \"go with the flow\".    hgb 11.4.  On exam the head is low in the pelvis.  Cervix very posterior.  RTC  1 wk.  RR  "

## 2018-11-19 ENCOUNTER — TELEPHONE (OUTPATIENT)
Dept: OBGYN | Facility: CLINIC | Age: 33
End: 2018-11-19

## 2018-11-19 NOTE — TELEPHONE ENCOUNTER
TC to pt to schedule appt tomorrow at [facility].  Per Dr. Boudreaux, pt could come at 8am or 11:30am.  LMTC.  Denise Funk RN

## 2018-11-20 ENCOUNTER — PRENATAL OFFICE VISIT (OUTPATIENT)
Dept: OBGYN | Facility: CLINIC | Age: 33
End: 2018-11-20
Payer: COMMERCIAL

## 2018-11-20 VITALS
DIASTOLIC BLOOD PRESSURE: 84 MMHG | OXYGEN SATURATION: 99 % | SYSTOLIC BLOOD PRESSURE: 120 MMHG | HEART RATE: 65 BPM | BODY MASS INDEX: 29.89 KG/M2 | HEIGHT: 68 IN | WEIGHT: 197.2 LBS

## 2018-11-20 DIAGNOSIS — Z34.03 NORMAL FIRST PREGNANCY IN THIRD TRIMESTER: Primary | ICD-10-CM

## 2018-11-20 PROCEDURE — 99207 ZZC PRENATAL VISIT: CPT | Performed by: OBSTETRICS & GYNECOLOGY

## 2018-11-20 NOTE — PROGRESS NOTES
40w3d  Feeling occ ctx's.  + FM.   Lost her mucus plug.  Feels like baby has dropped. No bloody show. Denies HA, visual changes.   discussed IOL at 41 wks.  RR

## 2018-11-20 NOTE — MR AVS SNAPSHOT
"              After Visit Summary   11/20/2018    Rashawn Jones    MRN: [id number]           Patient Information     Date Of Birth          1985        Visit Information        Provider Department      11/20/2018 8:00 AM Laine Boudreaux MD [facility]        Today's Diagnoses     Normal first pregnancy in third trimester    -  1       Follow-ups after your visit        Who to contact     If you have questions or need follow up information about today's clinic visit or your schedule please contact [facility] directly at [phone].  Normal or non-critical lab and imaging results will be communicated to you by [employer]hart, letter or phone within 4 business days after the clinic has received the results. If you do not hear from us within 7 days, please contact the clinic through [employer] E[employer] or phone. If you have a critical or abnormal lab result, we will notify you by phone as soon as possible.  Submit refill requests through [email or web address] or call your pharmacy and they will forward the refill request to us. Please allow 3 business days for your refill to be completed.          Additional Information About Your Visit        MyChart Information     [email or web address] gives you secure access to your electronic health record. If you see a primary care provider, you can also send messages to your care team and make appointments. If you have questions, please call your primary care clinic.  If you do not have a primary care provider, please call [phone] and they will assist you.        Care EveryWhere ID     This is your Care EveryWhere ID. This could be used by other organizations to access your [place] medical records  [id number]        Your Vitals Were     Pulse Height Last Period Pulse Oximetry BMI (Body Mass Index)       65 5' 7.5\" (1.715 m) 02/10/2018 99% 30.43 kg/m2        Blood Pressure from Last 3 Encounters:   11/20/18 120/84   11/13/18 111/70   10/30/18 122/82    Weight from Last 3 " Encounters:   11/20/18 197 lb 3.2 oz (89.4 kg)   11/13/18 196 lb 3.2 oz (89 kg)   10/30/18 193 lb (87.5 kg)              Today, you had the following     No orders found for display       Primary Care Provider Office Phone # Fax #    [facility] [phone]       [address] [facility] [address]        Equal Access to Services     RUTHIE THAYER : Hadii aad ku hadasho Soomaali, waaxda luqadaha, qaybta kaalmada adeegyada, waxay idiin hayaan adeeg bella de la cruz. So [facility] [phone].    ATENCIÓN: Si lyndsayla luc, tiene a kirkpatrick disposición servicios gratuitos de asistencia lingüística. Llame al [phone].    We comply with applicable federal civil rights laws and [place] laws. We do not discriminate on the basis of race, color, national origin, age, disability, sex, sexual orientation, or gender identity.            Thank you!     Thank you for choosing [facility]  for your care. Our goal is always to provide you with excellent care. Hearing back from our patients is one way we can continue to improve our services. Please take a few minutes to complete the written survey that you may receive in the mail after your visit with us. Thank you!             Your Updated Medication List - Protect others around you: Learn how to safely use, store and throw away your medicines at www.disposemymeds.org.          This list is accurate as of 11/20/18  8:44 AM.  Always use your most recent med list.                   Brand Name Dispense Instructions for use Diagnosis    IRON SUPPLEMENT PO      Take 18 mg by mouth        PRENATAL VITAMIN PO      Take 1 tablet by mouth daily    Pregnancy confirmed by positive urine test       VITAMIN D (CHOLECALCIFEROL) PO      Take 2,000 Units by mouth daily

## 2018-11-23 ENCOUNTER — HOSPITAL ENCOUNTER (INPATIENT)
Facility: CLINIC | Age: 33
LOS: 2 days | Discharge: HOME HEALTH | End: 2018-11-25
Attending: OBSTETRICS & GYNECOLOGY | Admitting: OBSTETRICS & GYNECOLOGY
Payer: COMMERCIAL

## 2018-11-23 DIAGNOSIS — D62 ANEMIA DUE TO BLOOD LOSS, ACUTE: ICD-10-CM

## 2018-11-23 PROBLEM — Z34.90 PREGNANCY: Status: ACTIVE | Noted: 2018-11-23

## 2018-11-23 LAB
ABO + RH BLD: NORMAL
ABO + RH BLD: NORMAL
BLD GP AB SCN SERPL QL: NORMAL
BLOOD BANK CMNT PATIENT-IMP: NORMAL
ERYTHROCYTE [DISTWIDTH] IN BLOOD BY AUTOMATED COUNT: 14.3 % (ref 10–15)
HCT VFR BLD AUTO: 34.4 % (ref 35–47)
HGB BLD-MCNC: 10.9 G/DL (ref 11.7–15.7)
MCH RBC QN AUTO: 26.7 PG (ref 26.5–33)
MCHC RBC AUTO-ENTMCNC: 31.7 G/DL (ref 31.5–36.5)
MCV RBC AUTO: 84 FL (ref 78–100)
PLATELET # BLD AUTO: 196 10E9/L (ref 150–450)
RBC # BLD AUTO: 4.09 10E12/L (ref 3.8–5.2)
SPECIMEN EXP DATE BLD: NORMAL
WBC # BLD AUTO: 8.5 10E9/L (ref 4–11)

## 2018-11-23 PROCEDURE — 25000128 H RX IP 250 OP 636: Performed by: OBSTETRICS & GYNECOLOGY

## 2018-11-23 PROCEDURE — 86780 TREPONEMA PALLIDUM: CPT | Performed by: OBSTETRICS & GYNECOLOGY

## 2018-11-23 PROCEDURE — 0KQM0ZZ REPAIR PERINEUM MUSCLE, OPEN APPROACH: ICD-10-PCS | Performed by: OBSTETRICS & GYNECOLOGY

## 2018-11-23 PROCEDURE — 86900 BLOOD TYPING SEROLOGIC ABO: CPT | Performed by: OBSTETRICS & GYNECOLOGY

## 2018-11-23 PROCEDURE — 25000132 ZZH RX MED GY IP 250 OP 250 PS 637: Performed by: OBSTETRICS & GYNECOLOGY

## 2018-11-23 PROCEDURE — 3E0S3BZ INTRODUCTION OF ANESTHETIC AGENT INTO EPIDURAL SPACE, PERCUTANEOUS APPROACH: ICD-10-PCS | Performed by: OBSTETRICS & GYNECOLOGY

## 2018-11-23 PROCEDURE — 12000032 ZZH R&B OB CRITICAL UMMC

## 2018-11-23 PROCEDURE — 86850 RBC ANTIBODY SCREEN: CPT | Performed by: OBSTETRICS & GYNECOLOGY

## 2018-11-23 PROCEDURE — 00HU33Z INSERTION OF INFUSION DEVICE INTO SPINAL CANAL, PERCUTANEOUS APPROACH: ICD-10-PCS | Performed by: OBSTETRICS & GYNECOLOGY

## 2018-11-23 PROCEDURE — 59200 INSERT CERVICAL DILATOR: CPT | Performed by: OBSTETRICS & GYNECOLOGY

## 2018-11-23 PROCEDURE — 86901 BLOOD TYPING SEROLOGIC RH(D): CPT | Performed by: OBSTETRICS & GYNECOLOGY

## 2018-11-23 PROCEDURE — 3E033VJ INTRODUCTION OF OTHER HORMONE INTO PERIPHERAL VEIN, PERCUTANEOUS APPROACH: ICD-10-PCS | Performed by: OBSTETRICS & GYNECOLOGY

## 2018-11-23 PROCEDURE — 85027 COMPLETE CBC AUTOMATED: CPT | Performed by: OBSTETRICS & GYNECOLOGY

## 2018-11-23 PROCEDURE — 59025 FETAL NON-STRESS TEST: CPT | Mod: 26 | Performed by: OBSTETRICS & GYNECOLOGY

## 2018-11-23 RX ORDER — OXYTOCIN/0.9 % SODIUM CHLORIDE 30/500 ML
100-340 PLASTIC BAG, INJECTION (ML) INTRAVENOUS CONTINUOUS PRN
Status: COMPLETED | OUTPATIENT
Start: 2018-11-23 | End: 2018-11-24

## 2018-11-23 RX ORDER — METHYLERGONOVINE MALEATE 0.2 MG/ML
200 INJECTION INTRAVENOUS
Status: DISCONTINUED | OUTPATIENT
Start: 2018-11-23 | End: 2018-11-24

## 2018-11-23 RX ORDER — PENICILLIN G POTASSIUM 5000000 [IU]/1
5 INJECTION, POWDER, FOR SOLUTION INTRAMUSCULAR; INTRAVENOUS ONCE
Status: COMPLETED | OUTPATIENT
Start: 2018-11-23 | End: 2018-11-23

## 2018-11-23 RX ORDER — MISOPROSTOL 100 UG/1
25 TABLET ORAL EVERY 4 HOURS PRN
Status: DISCONTINUED | OUTPATIENT
Start: 2018-11-23 | End: 2018-11-24

## 2018-11-23 RX ORDER — OXYCODONE AND ACETAMINOPHEN 5; 325 MG/1; MG/1
1 TABLET ORAL
Status: DISCONTINUED | OUTPATIENT
Start: 2018-11-23 | End: 2018-11-24

## 2018-11-23 RX ORDER — IBUPROFEN 800 MG/1
800 TABLET, FILM COATED ORAL
Status: COMPLETED | OUTPATIENT
Start: 2018-11-23 | End: 2018-11-24

## 2018-11-23 RX ORDER — NALOXONE HYDROCHLORIDE 0.4 MG/ML
.1-.4 INJECTION, SOLUTION INTRAMUSCULAR; INTRAVENOUS; SUBCUTANEOUS
Status: DISCONTINUED | OUTPATIENT
Start: 2018-11-23 | End: 2018-11-24

## 2018-11-23 RX ORDER — SODIUM CHLORIDE, SODIUM LACTATE, POTASSIUM CHLORIDE, CALCIUM CHLORIDE 600; 310; 30; 20 MG/100ML; MG/100ML; MG/100ML; MG/100ML
INJECTION, SOLUTION INTRAVENOUS CONTINUOUS
Status: DISCONTINUED | OUTPATIENT
Start: 2018-11-23 | End: 2018-11-24

## 2018-11-23 RX ORDER — CARBOPROST TROMETHAMINE 250 UG/ML
250 INJECTION, SOLUTION INTRAMUSCULAR
Status: DISCONTINUED | OUTPATIENT
Start: 2018-11-23 | End: 2018-11-24

## 2018-11-23 RX ORDER — ACETAMINOPHEN 325 MG/1
650 TABLET ORAL EVERY 4 HOURS PRN
Status: DISCONTINUED | OUTPATIENT
Start: 2018-11-23 | End: 2018-11-24

## 2018-11-23 RX ORDER — FENTANYL CITRATE 50 UG/ML
50-100 INJECTION, SOLUTION INTRAMUSCULAR; INTRAVENOUS
Status: DISCONTINUED | OUTPATIENT
Start: 2018-11-23 | End: 2018-11-24

## 2018-11-23 RX ORDER — OXYTOCIN 10 [USP'U]/ML
10 INJECTION, SOLUTION INTRAMUSCULAR; INTRAVENOUS
Status: DISCONTINUED | OUTPATIENT
Start: 2018-11-23 | End: 2018-11-24

## 2018-11-23 RX ORDER — ONDANSETRON 2 MG/ML
4 INJECTION INTRAMUSCULAR; INTRAVENOUS EVERY 6 HOURS PRN
Status: DISCONTINUED | OUTPATIENT
Start: 2018-11-23 | End: 2018-11-24

## 2018-11-23 RX ADMIN — Medication 2.5 MILLION UNITS: at 23:30

## 2018-11-23 RX ADMIN — Medication 25 MCG: at 18:41

## 2018-11-23 RX ADMIN — SODIUM CHLORIDE, POTASSIUM CHLORIDE, SODIUM LACTATE AND CALCIUM CHLORIDE: 600; 310; 30; 20 INJECTION, SOLUTION INTRAVENOUS at 23:30

## 2018-11-23 RX ADMIN — PENICILLIN G POTASSIUM 5 MILLION UNITS: 5000000 POWDER, FOR SOLUTION INTRAMUSCULAR; INTRAPLEURAL; INTRATHECAL; INTRAVENOUS at 19:08

## 2018-11-23 RX ADMIN — SODIUM CHLORIDE, POTASSIUM CHLORIDE, SODIUM LACTATE AND CALCIUM CHLORIDE: 600; 310; 30; 20 INJECTION, SOLUTION INTRAVENOUS at 18:30

## 2018-11-23 ASSESSMENT — ACTIVITIES OF DAILY LIVING (ADL)
RETIRED_EATING: 0-->INDEPENDENT
BATHING: 0-->INDEPENDENT
FALL_HISTORY_WITHIN_LAST_SIX_MONTHS: NO
COGNITION: 0 - NO COGNITION ISSUES REPORTED
TRANSFERRING: 0-->INDEPENDENT
DRESS: 0-->INDEPENDENT
TOILETING: 0-->INDEPENDENT
AMBULATION: 0-->INDEPENDENT
RETIRED_COMMUNICATION: 0-->UNDERSTANDS/COMMUNICATES WITHOUT DIFFICULTY
SWALLOWING: 0-->SWALLOWS FOODS/LIQUIDS WITHOUT DIFFICULTY

## 2018-11-23 NOTE — IP AVS SNAPSHOT
[facility]    [address] [address]    Phone:  [phone]                                       After Visit Summary   11/23/2018    Rashawn Jones    MRN: [id number]           After Visit Summary Signature Page     I have received my discharge instructions, and my questions have been answered. I have discussed any challenges I see with this plan with the nurse or doctor.    ..........................................................................................................................................  Patient/Patient Representative Signature      ..........................................................................................................................................  Patient Representative Print Name and Relationship to Patient    ..................................................               ................................................  Date                                   Time    ..........................................................................................................................................  Reviewed by Signature/Title    ...................................................              ..............................................  Date                                               Time          22EPIC Rev 08/18

## 2018-11-23 NOTE — IP AVS SNAPSHOT
MRN:[id number]                      After Visit Summary   11/23/2018    Rashawn Jones    MRN: [id number]           Thank you!     Thank you for choosing [facility] for your care. Our goal is always to provide you with excellent care. Hearing back from our patients is one way we can continue to improve our services. Please take a few minutes to complete the written survey that you may receive in the mail after you visit with us. Thank you!        Patient Information     Date Of Birth          1985        Designated Caregiver       Most Recent Value    Caregiver    Will someone help with your care after discharge? no    Name of designated caregiver Royal      About your hospital stay     You were admitted on:  November 23, 2018 You last received care in the:  [facility]    You were discharged on:  November 25, 2018        Reason for your hospital stay       Maternity care                  Who to Call     For medical emergencies, please call 911.  For non-urgent questions about your medical care, please call your primary care provider or clinic, [phone]          Attending Provider     Provider Malinda Nascimento MD OB/Gyn       Primary Care Provider Office Phone # Fax #    [facility] [phone]      After Care Instructions     Activity       Review discharge instructions    Activity Instructions:   - Vaginal delivery: Nothing in the vagina for 6 weeks, no intercourse for 6 weeks, you are not restricted on other activities, but rest for 1-2 weeks to allow your body to recover from delivery. No driving until you have stopped taking your pain medications.    Call your health care provider if you have any of the following: Fever above 100.4 F; soaking a sanitary pad with blood within 1 hour, or you see blood clots larger than a golf ball; malodorous vaginal discharge, severe or worsening pain uncontrolled by your pain medications, nausea and vomiting,  severe headaches, changes in vision, calf swelling or pain, shortness of breath, problems coping with sadness, anxiety, or depression.  If you have any concerns about hurting yourself or the baby, call your provider immediately. You are encouraged to call with questions or concerns after you return home.            Diet       Resume previous diet            Discharge Instructions - Hypertensive disorders patients       High Blood pressure patients to follow up in clinic or via home care within one week for a blood pressure check            Discharge Instructions - Postpartum visit       Schedule postpartum visit with your provider and return to clinic in 6 weeks.                  Follow-up Appointments     Follow Up and recommended labs and tests       Follow up in 1 week for a blood pressure check and in 6 weeks for a routine postpartum check.                  Further instructions from your care team       Postpartum Vaginal Delivery Instructions    Activity       Ask family and friends for help when you need it.    Do not place anything in your vagina for 6 weeks.    You are not restricted on other activities, but take it easy for a few weeks to allow your body to recover from delivery.  You are able to do any activities you feel up to that point.    No driving until you have stopped taking your pain medications (usually two weeks after delivery).     Call your health care provider if you have any of these symptoms:       Increased pain, swelling, redness, or fluid around your stiches from an episiotomy or perineal tear.    A fever above 100.4 F (38 C) with or without chills when placing a thermometer under your tongue.    You soak a sanitary pad with blood within 1 hour, or you see blood clots larger than a golf ball.    Bleeding that lasts more than 6 weeks.    Vaginal discharge that smells bad.    Severe pain, cramping or tenderness in your lower belly area.    A need to urinate more frequently (use the toilet  more often), more urgently (use the toilet very quickly), or it burns when you urinate.    Nausea and vomiting.    Redness, swelling or pain around a vein in your leg.    Problems breastfeeding or a red or painful area on your breast.    Chest pain and cough or are gasping for air.    Problems coping with sadness, anxiety, or depression.  If you have any concerns about hurting yourself or the baby, call your provider immediately.     You have questions or concerns after you return home.     Keep your hands clean:  Always wash your hands before touching your perineal area and stitches.  This helps reduce your risk of infection.  If your hands aren't dirty, you may use an alcohol hand-rub to clean your hands. Keep your nails clean and short.        Pending Results     No orders found from 11/21/2018 to 11/24/2018.            Statement of Approval     Ordered          11/25/18 0959  I have reviewed and agree with all the recommendations and orders detailed in this document.  EFFECTIVE NOW     Approved and electronically signed by:  Laine Boudreaux MD             Admission Information     Date & Time Provider Department Dept. Phone    11/23/2018 Malinda Morgan MD [facility] [phone]      Your Vitals Were     Blood Pressure Pulse Temperature Respirations Last Period Pulse Oximetry    134/87 78 97.6  F (36.4  C) (Oral) 16 02/10/2018 100%      MyChart Information     [employer] gives you secure access to your electronic health record. If you see a primary care provider, you can also send messages to your care team and make appointments. If you have questions, please call your primary care clinic.  If you do not have a primary care provider, please call [phone] and they will assist you.        Care EveryWhere ID     This is your Care EveryWhere ID. This could be used by other organizations to access your [facility] medical records  [id number]        Equal Access to Services     RUTHIE THAYER AH: Libia blanca  Soomaali, waaxda luqadaha, qaybta kaalmada adejosephine, tip jeremiein hayaan rkikidarrell willmars lamaria mvasile jono. So [facility] [phone].    ATENCIÓN: Si yoon calle, tiene a kirkpatrick disposición servicios gratuitos de asistencia lingüística. Ronaldo al [phone].    We comply with applicable federal civil rights laws and [place] laws. We do not discriminate on the basis of race, color, national origin, age, disability, sex, sexual orientation, or gender identity.               Review of your medicines      START taking        Dose / Directions    ferrous gluconate 324 (38 Fe) MG tablet   Commonly known as:  FERGON   Used for:  Anemia due to blood loss, acute        Dose:  324 mg   Take 1 tablet (324 mg) by mouth daily (with breakfast)   Quantity:  100 tablet   Refills:  1       ibuprofen 800 MG tablet   Commonly known as:  ADVIL/MOTRIN        Dose:  800 mg   Take 1 tablet (800 mg) by mouth every 6 hours as needed for other (cramping)   Quantity:  90 tablet   Refills:  0       senna-docusate 8.6-50 MG per tablet   Commonly known as:  SENOKOT-S;PERICOLACE        Dose:  1 tablet   Take 1 tablet by mouth 2 times daily   Quantity:  100 tablet   Refills:  0         CONTINUE these medicines which have NOT CHANGED        Dose / Directions    IRON SUPPLEMENT PO        Dose:  18 mg   Take 18 mg by mouth   Refills:  0       PRENATAL VITAMIN PO   Used for:  Pregnancy confirmed by positive urine test        Dose:  1 tablet   Take 1 tablet by mouth daily   Refills:  0       VITAMIN D (CHOLECALCIFEROL) PO        Dose:  2000 Units   Take 2,000 Units by mouth daily   Refills:  0            Where to get your medicines      These medications were sent to [facility] [place], [place] - [address]  [address] [address]     Phone:  [phone]     ferrous gluconate 324 (38 Fe) MG tablet    ibuprofen 800 MG tablet    senna-docusate 8.6-50 MG per tablet                Protect others around you: Learn how to  safely use, store and throw away your medicines at www.disposemymeds.org.             Medication List: This is a list of all your medications and when to take them. Check marks below indicate your daily home schedule. Keep this list as a reference.      Medications           Morning Afternoon Evening Bedtime As Needed    ferrous gluconate 324 (38 Fe) MG tablet   Commonly known as:  FERGON   Take 1 tablet (324 mg) by mouth daily (with breakfast)                                ibuprofen 800 MG tablet   Commonly known as:  ADVIL/MOTRIN   Take 1 tablet (800 mg) by mouth every 6 hours as needed for other (cramping)   Last time this was given:  800 mg on 11/25/2018  7:34 AM                                IRON SUPPLEMENT PO   Take 18 mg by mouth                                PRENATAL VITAMIN PO   Take 1 tablet by mouth daily                                senna-docusate 8.6-50 MG per tablet   Commonly known as:  SENOKOT-S;PERICOLACE   Take 1 tablet by mouth 2 times daily   Last time this was given:  2 tablets on 11/25/2018 10:03 AM                                VITAMIN D (CHOLECALCIFEROL) PO   Take 2,000 Units by mouth daily                                          More Information        Understanding Postpartum Depression  You ve just had a baby. You expected to be excited and happy. But instead you find yourself crying for no reason. You may have trouble coping with your daily tasks. You feel sad, tired, and hopeless most of the time. You may even feel ashamed or guilty. But what you re going through is not your fault and you can feel better. Talk to your healthcare provider. He or she can help.    What is depression?  Depression is a mood disorder that affects the way you think and feel. The most common symptom is a feeling of deep sadness. You may also feel as if you just can t cope with life. Other symptoms include:    Gaining or losing a lot of weight    Sleeping too much or too little    Feeling tired all the  "time    Feeling restless    Crying a lot    Having too little or too much appetite.    Withdrawing from friends and family    Having headaches, aches and pains, or stomach problems that won't go away.    Fears of harming your baby    Lack of interest in your baby    Feeling worthless or guilty    No longer finding pleasure in things you used to    Having trouble thinking clearly or making decisions    Thinking about death or suicide   Depression after childbirth  You may be weepy and tired right after giving birth. These feelings are normal. They re sometimes called the  baby blues.  These blues go away after 1 to 2 weeks. However, postpartum (meaning  after birth ) depression lasts much longer and is more severe than the \"baby blues.\" It can make you feel sad and hopeless. You may also fear that your baby will be harmed and worry about being a bad mother.  What causes postpartum depression?  The exact cause of postpartum depression is unknown. Changes in brain chemistry or structure are believed to play a big role in depression. It may be due to changes in your hormones during and after childbirth. You may also be tired from caring for your baby and adjusting to being a mother. All these factors may make you feel depressed. In some cases, your genes may also play a role.  Depression can be treated  There are many ways to treat postpartum depression. Talking to your healthcare provider is the first step toward feeling better.  When to call your healthcare provider  Call your healthcare provider if you:     Cry for no clear reason    Have trouble sleeping, eating, and making choices    Questions whether you can handle caring for a baby    Have intense feelings of sadness, anxiety, or despair that prevent you from being able to do your daily tasks  Resources    National [place] of Mental Bnezon916-433-7773cuv.[facility].nih.gov    National [place] on Mental Qntwqhw238-824-0016sjw.charlie.org    Mental Health " Juhajmf766-167-5743xis.[facility].org    National Suicide Iuuxmek030-893-2508 (800-SUICIDE)   Date Last Reviewed: 7/1/2017 2000-2018 [employer]. [address] [place] [address], [place] [address]. All rights reserved. This information is not intended as a substitute for professional medical care. Always follow your healthcare professional's instructions.

## 2018-11-24 ENCOUNTER — ANESTHESIA (OUTPATIENT)
Dept: OBGYN | Facility: CLINIC | Age: 33
End: 2018-11-24
Payer: COMMERCIAL

## 2018-11-24 ENCOUNTER — ANESTHESIA EVENT (OUTPATIENT)
Dept: OBGYN | Facility: CLINIC | Age: 33
End: 2018-11-24
Payer: COMMERCIAL

## 2018-11-24 LAB
ALT SERPL W P-5'-P-CCNC: 19 U/L (ref 0–50)
AST SERPL W P-5'-P-CCNC: 17 U/L (ref 0–45)
CREAT SERPL-MCNC: 0.85 MG/DL (ref 0.52–1.04)
ERYTHROCYTE [DISTWIDTH] IN BLOOD BY AUTOMATED COUNT: 14.3 % (ref 10–15)
GFR SERPL CREATININE-BSD FRML MDRD: 78 ML/MIN/1.7M2
HCT VFR BLD AUTO: 33.4 % (ref 35–47)
HGB BLD-MCNC: 10.7 G/DL (ref 11.7–15.7)
MCH RBC QN AUTO: 27 PG (ref 26.5–33)
MCHC RBC AUTO-ENTMCNC: 32 G/DL (ref 31.5–36.5)
MCV RBC AUTO: 84 FL (ref 78–100)
PLATELET # BLD AUTO: 205 10E9/L (ref 150–450)
RBC # BLD AUTO: 3.97 10E12/L (ref 3.8–5.2)
T PALLIDUM AB SER QL: NONREACTIVE
WBC # BLD AUTO: 12.7 10E9/L (ref 4–11)

## 2018-11-24 PROCEDURE — 25000128 H RX IP 250 OP 636: Performed by: STUDENT IN AN ORGANIZED HEALTH CARE EDUCATION/TRAINING PROGRAM

## 2018-11-24 PROCEDURE — 25000125 ZZHC RX 250: Performed by: STUDENT IN AN ORGANIZED HEALTH CARE EDUCATION/TRAINING PROGRAM

## 2018-11-24 PROCEDURE — 84460 ALANINE AMINO (ALT) (SGPT): CPT | Performed by: STUDENT IN AN ORGANIZED HEALTH CARE EDUCATION/TRAINING PROGRAM

## 2018-11-24 PROCEDURE — 25000128 H RX IP 250 OP 636: Performed by: OBSTETRICS & GYNECOLOGY

## 2018-11-24 PROCEDURE — 85027 COMPLETE CBC AUTOMATED: CPT | Performed by: STUDENT IN AN ORGANIZED HEALTH CARE EDUCATION/TRAINING PROGRAM

## 2018-11-24 PROCEDURE — 59400 OBSTETRICAL CARE: CPT | Mod: GC | Performed by: OBSTETRICS & GYNECOLOGY

## 2018-11-24 PROCEDURE — 12000032 ZZH R&B OB CRITICAL UMMC

## 2018-11-24 PROCEDURE — 25000132 ZZH RX MED GY IP 250 OP 250 PS 637: Performed by: STUDENT IN AN ORGANIZED HEALTH CARE EDUCATION/TRAINING PROGRAM

## 2018-11-24 PROCEDURE — 72200001 ZZH LABOR CARE VAGINAL DELIVERY SINGLE

## 2018-11-24 PROCEDURE — 84450 TRANSFERASE (AST) (SGOT): CPT | Performed by: STUDENT IN AN ORGANIZED HEALTH CARE EDUCATION/TRAINING PROGRAM

## 2018-11-24 PROCEDURE — 82565 ASSAY OF CREATININE: CPT | Performed by: STUDENT IN AN ORGANIZED HEALTH CARE EDUCATION/TRAINING PROGRAM

## 2018-11-24 PROCEDURE — 25000125 ZZHC RX 250: Performed by: OBSTETRICS & GYNECOLOGY

## 2018-11-24 PROCEDURE — 36415 COLL VENOUS BLD VENIPUNCTURE: CPT | Performed by: STUDENT IN AN ORGANIZED HEALTH CARE EDUCATION/TRAINING PROGRAM

## 2018-11-24 PROCEDURE — 25000132 ZZH RX MED GY IP 250 OP 250 PS 637: Performed by: OBSTETRICS & GYNECOLOGY

## 2018-11-24 RX ORDER — IBUPROFEN 800 MG/1
800 TABLET, FILM COATED ORAL EVERY 6 HOURS PRN
Status: DISCONTINUED | OUTPATIENT
Start: 2018-11-24 | End: 2018-11-25 | Stop reason: HOSPADM

## 2018-11-24 RX ORDER — AMOXICILLIN 250 MG
1 CAPSULE ORAL 2 TIMES DAILY
Status: DISCONTINUED | OUTPATIENT
Start: 2018-11-24 | End: 2018-11-25 | Stop reason: HOSPADM

## 2018-11-24 RX ORDER — LIDOCAINE HYDROCHLORIDE 10 MG/ML
INJECTION, SOLUTION INFILTRATION; PERINEURAL PRN
Status: DISCONTINUED | OUTPATIENT
Start: 2018-11-24 | End: 2018-11-24 | Stop reason: HOSPADM

## 2018-11-24 RX ORDER — OXYTOCIN/0.9 % SODIUM CHLORIDE 30/500 ML
100 PLASTIC BAG, INJECTION (ML) INTRAVENOUS CONTINUOUS
Status: DISCONTINUED | OUTPATIENT
Start: 2018-11-24 | End: 2018-11-25 | Stop reason: HOSPADM

## 2018-11-24 RX ORDER — BISACODYL 10 MG
10 SUPPOSITORY, RECTAL RECTAL DAILY PRN
Status: DISCONTINUED | OUTPATIENT
Start: 2018-11-26 | End: 2018-11-25 | Stop reason: HOSPADM

## 2018-11-24 RX ORDER — NALOXONE HYDROCHLORIDE 0.4 MG/ML
.1-.4 INJECTION, SOLUTION INTRAMUSCULAR; INTRAVENOUS; SUBCUTANEOUS
Status: DISCONTINUED | OUTPATIENT
Start: 2018-11-24 | End: 2018-11-24

## 2018-11-24 RX ORDER — OXYTOCIN/0.9 % SODIUM CHLORIDE 30/500 ML
PLASTIC BAG, INJECTION (ML) INTRAVENOUS
Status: DISCONTINUED
Start: 2018-11-24 | End: 2018-11-24 | Stop reason: HOSPADM

## 2018-11-24 RX ORDER — FENTANYL CITRATE 50 UG/ML
INJECTION, SOLUTION INTRAMUSCULAR; INTRAVENOUS PRN
Status: DISCONTINUED | OUTPATIENT
Start: 2018-11-24 | End: 2018-11-24 | Stop reason: HOSPADM

## 2018-11-24 RX ORDER — MISOPROSTOL 200 UG/1
800 TABLET ORAL
Status: DISCONTINUED | OUTPATIENT
Start: 2018-11-24 | End: 2018-11-25 | Stop reason: HOSPADM

## 2018-11-24 RX ORDER — MISOPROSTOL 200 UG/1
TABLET ORAL
Status: DISCONTINUED
Start: 2018-11-24 | End: 2018-11-24 | Stop reason: HOSPADM

## 2018-11-24 RX ORDER — OXYTOCIN 10 [USP'U]/ML
INJECTION, SOLUTION INTRAMUSCULAR; INTRAVENOUS
Status: DISCONTINUED
Start: 2018-11-24 | End: 2018-11-24 | Stop reason: HOSPADM

## 2018-11-24 RX ORDER — NALOXONE HYDROCHLORIDE 0.4 MG/ML
.1-.4 INJECTION, SOLUTION INTRAMUSCULAR; INTRAVENOUS; SUBCUTANEOUS
Status: DISCONTINUED | OUTPATIENT
Start: 2018-11-24 | End: 2018-11-25 | Stop reason: HOSPADM

## 2018-11-24 RX ORDER — ACETAMINOPHEN 325 MG/1
650 TABLET ORAL EVERY 4 HOURS PRN
Status: DISCONTINUED | OUTPATIENT
Start: 2018-11-24 | End: 2018-11-25 | Stop reason: HOSPADM

## 2018-11-24 RX ORDER — OXYTOCIN/0.9 % SODIUM CHLORIDE 30/500 ML
340 PLASTIC BAG, INJECTION (ML) INTRAVENOUS CONTINUOUS PRN
Status: DISCONTINUED | OUTPATIENT
Start: 2018-11-24 | End: 2018-11-25 | Stop reason: HOSPADM

## 2018-11-24 RX ORDER — LANOLIN 100 %
OINTMENT (GRAM) TOPICAL
Status: DISCONTINUED | OUTPATIENT
Start: 2018-11-24 | End: 2018-11-25 | Stop reason: HOSPADM

## 2018-11-24 RX ORDER — LIDOCAINE HYDROCHLORIDE 10 MG/ML
INJECTION, SOLUTION INFILTRATION; PERINEURAL
Status: DISCONTINUED
Start: 2018-11-24 | End: 2018-11-24 | Stop reason: HOSPADM

## 2018-11-24 RX ORDER — HYDROCORTISONE 2.5 %
CREAM (GRAM) TOPICAL 3 TIMES DAILY PRN
Status: DISCONTINUED | OUTPATIENT
Start: 2018-11-24 | End: 2018-11-25 | Stop reason: HOSPADM

## 2018-11-24 RX ORDER — AMOXICILLIN 250 MG
1 CAPSULE ORAL 2 TIMES DAILY
Qty: 100 TABLET | Refills: 0 | Status: SHIPPED | OUTPATIENT
Start: 2018-11-24 | End: 2019-02-05

## 2018-11-24 RX ORDER — LIDOCAINE HYDROCHLORIDE AND EPINEPHRINE 15; 5 MG/ML; UG/ML
INJECTION, SOLUTION EPIDURAL PRN
Status: DISCONTINUED | OUTPATIENT
Start: 2018-11-24 | End: 2018-11-24 | Stop reason: HOSPADM

## 2018-11-24 RX ORDER — IBUPROFEN 800 MG/1
800 TABLET, FILM COATED ORAL EVERY 6 HOURS PRN
Qty: 90 TABLET | Refills: 0 | Status: SHIPPED | OUTPATIENT
Start: 2018-11-24 | End: 2019-02-05

## 2018-11-24 RX ORDER — FENTANYL/BUPIVACAINE/NS/PF 2-1250MCG
PLASTIC BAG, INJECTION (ML) INJECTION CONTINUOUS PRN
Status: DISCONTINUED | OUTPATIENT
Start: 2018-11-24 | End: 2018-11-24 | Stop reason: HOSPADM

## 2018-11-24 RX ORDER — OXYTOCIN 10 [USP'U]/ML
10 INJECTION, SOLUTION INTRAMUSCULAR; INTRAVENOUS
Status: DISCONTINUED | OUTPATIENT
Start: 2018-11-24 | End: 2018-11-25 | Stop reason: HOSPADM

## 2018-11-24 RX ORDER — NALBUPHINE HYDROCHLORIDE 10 MG/ML
2.5-5 INJECTION, SOLUTION INTRAMUSCULAR; INTRAVENOUS; SUBCUTANEOUS EVERY 6 HOURS PRN
Status: DISCONTINUED | OUTPATIENT
Start: 2018-11-24 | End: 2018-11-24

## 2018-11-24 RX ORDER — EPHEDRINE SULFATE 50 MG/ML
5 INJECTION, SOLUTION INTRAMUSCULAR; INTRAVENOUS; SUBCUTANEOUS
Status: DISCONTINUED | OUTPATIENT
Start: 2018-11-24 | End: 2018-11-24

## 2018-11-24 RX ORDER — AMOXICILLIN 250 MG
2 CAPSULE ORAL 2 TIMES DAILY
Status: DISCONTINUED | OUTPATIENT
Start: 2018-11-24 | End: 2018-11-25 | Stop reason: HOSPADM

## 2018-11-24 RX ADMIN — FENTANYL CITRATE 25 MCG: 50 INJECTION, SOLUTION INTRAMUSCULAR; INTRAVENOUS at 03:33

## 2018-11-24 RX ADMIN — ACETAMINOPHEN 650 MG: 325 TABLET, FILM COATED ORAL at 12:44

## 2018-11-24 RX ADMIN — LIDOCAINE HYDROCHLORIDE 20 ML: 10 INJECTION, SOLUTION INFILTRATION; PERINEURAL at 10:10

## 2018-11-24 RX ADMIN — Medication 2.5 MILLION UNITS: at 08:43

## 2018-11-24 RX ADMIN — Medication: at 03:30

## 2018-11-24 RX ADMIN — ACETAMINOPHEN 650 MG: 325 TABLET, FILM COATED ORAL at 20:58

## 2018-11-24 RX ADMIN — LIDOCAINE HYDROCHLORIDE 5 ML: 10 INJECTION, SOLUTION INFILTRATION; PERINEURAL at 03:25

## 2018-11-24 RX ADMIN — SODIUM CHLORIDE, POTASSIUM CHLORIDE, SODIUM LACTATE AND CALCIUM CHLORIDE: 600; 310; 30; 20 INJECTION, SOLUTION INTRAVENOUS at 04:11

## 2018-11-24 RX ADMIN — IBUPROFEN 800 MG: 800 TABLET, FILM COATED ORAL at 12:24

## 2018-11-24 RX ADMIN — Medication 12 ML/HR: at 03:35

## 2018-11-24 RX ADMIN — SENNOSIDES AND DOCUSATE SODIUM 2 TABLET: 8.6; 5 TABLET ORAL at 20:58

## 2018-11-24 RX ADMIN — OXYTOCIN-SODIUM CHLORIDE 0.9% IV SOLN 30 UNIT/500ML 340 ML/HR: 30-0.9/5 SOLUTION at 10:07

## 2018-11-24 RX ADMIN — ACETAMINOPHEN 650 MG: 325 TABLET, FILM COATED ORAL at 17:26

## 2018-11-24 RX ADMIN — Medication 2.5 MILLION UNITS: at 04:11

## 2018-11-24 RX ADMIN — IBUPROFEN 800 MG: 800 TABLET, FILM COATED ORAL at 19:04

## 2018-11-24 RX ADMIN — LIDOCAINE HYDROCHLORIDE,EPINEPHRINE BITARTRATE 3 ML: 15; .005 INJECTION, SOLUTION EPIDURAL; INFILTRATION; INTRACAUDAL; PERINEURAL at 03:33

## 2018-11-24 NOTE — PLAN OF CARE
Problem: Patient Care Overview  Goal: Plan of Care/Patient Progress Review  Outcome: Improving  Data: Rashawn Jones transferred to 73 via wheelchair at 1210. Baby transferred via parent's arms. Voided. Running pitocin IV.   Action: Receiving unit notified of transfer: Yes. Patient and family notified of room change. Report given to Elisa PERES. Belongings sent to receiving unit. Accompanied by Registered Nurse. Oriented patient to surroundings. Call light within reach. ID bands double-checked with receiving RN.  Response: Patient tolerated transfer and is stable.  at bedside.

## 2018-11-24 NOTE — PROVIDER NOTIFICATION
11/24/18 0609   Provider Notification   Provider Name/Title Dr. Gross   Method of Notification Electronic Page   Request Evaluate - Remote   Notification Reason Status Update;Maternal Vital Sign Change     Notified provider that patient had two more blood pressures about 140. 149/83 and 142/77. Patient denies any signs of symptoms of pre-e at this time.

## 2018-11-24 NOTE — PROVIDER NOTIFICATION
11/23/18 2219   Provider Notification   Provider Name/Title Dr. Boudreaux   Method of Notification Phone   Notification Reason Status Update     Notified provider that patient is now ambulating the halls. Patient is feeling a little more uncomfortable, having to stop what she is doing during contractions. Notified provider that patient has had a couple of longer contractions last 3-4 minutes, and also a few late decelerations in which the patient received a bolus of fluid. No other concerns at this time.

## 2018-11-24 NOTE — PROGRESS NOTES
[facility]  Labor Progress Note    S: More comfortable w/ epidural. Denies HA, spots in vision, chest pain, SOB, upper abd pain.    O:   Patient Vitals for the past 4 hrs:   BP Temp Temp src Resp SpO2   18 0517 129/63 98.5  F (36.9  C) Oral 18 -   18 0402 144/60 - - 16 -   18 0400 142/77 98.8  F (37.1  C) Oral - -   18 0359 138/67 - - 18 98 %   18 0356 132/77 - - 18 -   18 0355 131/73 - - 18 -   18 0353 (!) 125/97 - - 18 -   18 0350 133/83 - - 18 -   18 0348 130/78 - - - -   18 0346 128/76 - - 16 -   18 0345 134/68 - - 16 -   18 0342 132/83 - - 18 -   18 0340 130/86 - - 18 -   18 0338 141/89 - - 18 -   18 0336 138/86 - - 18 -   18 0334 (!) 145/91 - - 18 98 %   18 0306 144/90 - - - -   18 0300 - 98.3  F (36.8  C) Oral 18 -       SVE: 5/90/0    FHT: Baseline 140, moderate variability, + accelerations,recurrent early decelerations and 2 late decels when pt was flat on back for straight cath  FSE: Ctx q2min    Results for MASSIEL RIVAS (MRN [id number]) as of 2018 05:21   2018 04:40   Creatinine 0.85   GFR Estimate 78   GFR Estimate If Black >90   ALT 19   AST 17   WBC 12.7 (H)   Hemoglobin 10.7 (L)   Hematocrit 33.4 (L)   Platelet Count 205     A/P:  Ms. Massiel Rivas is a 33 year old  at 41w0d by LMP c/w 19+1wk US, here for IOL for postdates.    Labor: S/p PV miso x1, SROM w/ mec, progressing spontaneously, latent labor but likely transitioning (shivering)  - NICU at [facility] for mec  - epidural in place  - anticipate     Gestational HTN: New dx intrapartum  - HELLP labs wnl. Asymptomatic    FWB: Category II FHT  - continuous EFM  - GBS post, PCN started at 1908  - Placenta posterior, EFW 9#, vtx by BSUS    Dawna Gross MD  Ob/Gyn, PGY-3  2018, 05:31

## 2018-11-24 NOTE — PROGRESS NOTES
[facility]  Labor Progress Note    S: Rashawn is in a significant amount of pain. She has requested an epidural. SROM with moderate amount of meconium stained fluid.    O:   Patient Vitals for the past 12 hrs:   BP Temp Temp src Pulse Resp   18 0023 121/75 - - - -   18 2300 - 98.4  F (36.9  C) Axillary - 18   18 2207 127/70 - - - -   18 2159 143/78 - - - -   18 1808 120/74 - - - 20   18 1757 (!) 135/96 98.1  F (36.7  C) - 88 20   BP @ 03:34 in centricity 145/91      SVE: 4/90/+1    FHT: Baseline 135-145, moderate variability, + accelerations, intermittent late decelerations to jamey of 130s lasting 20sec  Jenner>FSE: Ctx q1-2min    FSE placed due to difficulty with tracing s/p SROM    A/P:  Ms. Rashawn Jones is a 33 year old  at 41w0d by LMP c/w 19+1wk US, here for IOL.    Labor: S/p PV miso x1, SROM w/ mec  - NICU at [facility] for mec  - epidural now  - anticipate     Gestational HTN: most recent BPs may be due to pain prior to epidural BUT did have mimld range on admit to.  - HELLP labs now    FWB: Category II FHT, reactive  - continuous EFM  - GBS post, PCN started at 1908  - Placenta posterior, EFW 9#, vtx by BSUS      Renay Owen, MS3    Pt seen and examined by me, agree w/ above note, edits made as needed to reflect plan.    Dawna Gross MD  Ob/Gyn, PGY-3  2018, 2:58 AM

## 2018-11-24 NOTE — PROVIDER NOTIFICATION
11/24/18 0557   Provider Notification   Provider Name/Title Dr. Gross   Method of Notification Electronic Page   Request Evaluate - Remote   Notification Reason Status Update;Decels     Notified provider that patient had a 4 minute long prolonged decel with jamey to 110. Notified provider that patient was turned side to side, and baby recovered to baseline.

## 2018-11-24 NOTE — L&D DELIVERY NOTE
Delivery Summary with Magallanes  DELIVERY SUMMARY    Rashawn Jones MRN# [id number]   Age: 33 year old YOB: 1985     ASSESSMENT & PLAN:   Rashawn Jones is a 33 year old  at 41w0d who presented to L&D for induction of labor for late term pregnancy. Pregnancy was complicated by: gestational HTN, GBS positive.    Patient's SVE on admission was 2/70, vaginal misoprostol was used for cervical ripening, and SROM occurred at 3-4cm. Labor progressed without augmentation. The patient progressed to complete and pushed for approximately 40 minutes. FHT was Cat I for the labor course. She subsequently had an uncomplicated  of a male infant at 10:06 on 2018 . The infant head was delivered in ALANNA position. Nuchal cord x1, delivered through without difficulty. Apgars were 8 and 9 and 1 and 5 minutes. Weight pending. Routine cord blood was obtained. IV pitocin was started for active management of the third stage. The placenta was delivered with gentle downward traction and maternal expulsive efforts. It was found to be intact with a three vessel cord. Uterine tone was excellent. She sustained a second degree perineal laceration that was repaired in the usual fashion with a 3-0 Vicryl. Upon final inspection, there was good hemostasis. Instrument and sharp count was correct. QBL: 280 mL    Dr. Madrid was present for delivery    Micki Strong MD  OB/GYN PGY-1  18 10:48 AM     I was scrubbed and present for entire delivery and repair, agree with above operative note.    SANDY MADRID MD       [facility] Assessment Tool Data    Gestational Age:  Gestational Age: 41w0d     Maternal temperature range:  Temp  Av.4  F (36.9  C)  Min: 98.1  F (36.7  C)  Max: 98.8  F (37.1  C)    Membranes ruptured for:   7h 36m     GBS status:  Lab Results   Component Value Date    GBS Positive (A) 10/23/2018       Antibiotic Status:  Antibiotics Penicillin       IV Antibiotic Given Greater than 4 hours prior to  delivery       Additional Management      Fetal Status Prior to  Delivery Category 1    Fetal Status Comments         Sepsis Prebirth Score:       Sepsis Postbirth Score:       Determination based on clinical exam after birth:       Disposition:            Labor Event Times    Labor onset date:  18 Onset time:   5:30 AM   Dilation complete date:  18 Complete time:   9:26 AM   Start pushing date/time:  2018 0935            Labor Length    1st Stage (hrs):  3 (min):  56   2nd Stage (hrs):  0 (min):  40   3rd Stage (hrs):  0 (min):  6      Labor Events     labor?:  No    steroids:  None   Labor Type:  Induction   Predominate monitoring during 1st stage:  continuous electronic fetal monitoring      Antibiotics received during labor?:  Yes   Reason for Antibiotics:  GBS   Antibiotics received for GBS:  Penicillin   Antibiotics Given (GBS):  Greater than 4 hours prior to delivery      Rupture identifier:  Rupture 1   Rupture date/time: 18 0230   Rupture type:  Spontaneous rupture of membranes occuring during spontaneous labor or augmentation   Fluid color:  Meconium      Induction:  Misoprostol   Induction date/time:     Cervical ripening date/time:     Indications for induction:  Post-term Gestation      1:1 continuous labor support provided by?:  RN          Delivery/Placenta Date and Time    Delivery Date:  18 Delivery Time:  10:06 AM   Placenta Date/Time:  2018 10:12 AM   Oxytocin given at the time of delivery:  after delivery of baby      Vaginal Counts    Initial count performed by 2 team members:   Two Team Members   Dr Eddie arnold RN           [employer]   Initial counts 2 2 5    Added to count       Final counts 2 2 5       Placed during labor Accounted for at the end of labor   Yes Yes   No No   No No      Final count performed by 2 team members:   Two Team Members   Dr Eddie Reece  "Sri PERES         Final count correct?:  Yes         Apgars    Living status:  Living    1 Minute 5 Minute 10 Minute 15 Minute 20 Minute   Skin color: 0  1       Heart rate: 2  2       Reflex irritability: 2  2       Muscle tone: 2  2       Respiratory effort: 2  2       Total: 8  9          Apgars assigned by:  1\" APGAR BY BROOK FRANK,CNP/ 5 MIN APGAR= K AUGUSTINE PERES      Cord    Vessels:  3 Vessels    Cord Blood Disposition:  Lab Gases Sent?:  No          Resuscitation    Methods:  None       Care at Delivery:  NICU team called by Dr. Sandy Madrid to the delivery of a post-term infant with thick meconium. Infant delivered to perineum with spontaneous lusty cry and good tone. Continued good respiratory effort and strong cry at 1 minute, NICU team dismissed. BROOK Frank, CNP-BC 2018 10:18 AM     Output in Delivery Room:  Stool      Skin to Skin and Feeding Plan    Skin to skin initiation date/time: 18 1006   Skin to skin with:  Mother   Skin to skin end date/time:     How do you plan to feed your baby:  Breastfeeding      Labor Events and Shoulder Dystocia    Fetal Tracing Prior to Delivery:  Category 1   Shoulder dystocia present?:  Neg            Delivery (Maternal) (Provider to Complete) (816882)    Episiotomy:  None   Perineal lacerations:  2nd Repaired?:  Yes         Mother's Information  Mother: Rashawn Jones #[id number]    Start of Mother's Information     IO Blood Loss  18 0530 - 18 1046    Mom's I/O Activity            End of Mother's Information  Mother: Rashawn Jones #[id number]            Delivery - Provider to Complete ([id number])    Delivering clinician:  SADNY MADRID   Attempted Delivery Types (Choose all that apply):  Spontaneous Vaginal Delivery   Delivery Type (Choose the 1 that will go to the Birth History):  Vaginal, Spontaneous Delivery                     Other personnel:   Provider Role   LUZ VELASCO " Delivery Nurse            Placenta    Delayed Cord Clamping:  Done   Date/Time:  11/24/2018 10:12 AM   Removal:  Spontaneous   Disposition:  Hospital disposal      Anesthesia    Method:  Epidural         Presentation and Position    Presentation:  Vertex   Position:  Left Occiput Anterior                    Micki Strong MD

## 2018-11-24 NOTE — ANESTHESIA PREPROCEDURE EVALUATION
"Anesthesia Pre-Procedure Evaluation    Patient: Rashawn Jones   MRN:     [id number] Gender:   female   Age:    33 year old :      1985        Preoperative Diagnosis: * No surgery found *        History reviewed. No pertinent past medical history.   Past Surgical History:   Procedure Laterality Date     NO HISTORY OF SURGERY            Anesthesia Evaluation     .             ROS/MED HX    ENT/Pulmonary:  - neg pulmonary ROS     Neurologic:  - neg neurologic ROS     Cardiovascular:  - neg cardiovascular ROS       METS/Exercise Tolerance:     Hematologic:     (+) Anemia, -      Musculoskeletal:  - neg musculoskeletal ROS       GI/Hepatic:  - neg GI/hepatic ROS       Renal/Genitourinary:  - ROS Renal section negative       Endo:  - neg endo ROS       Psychiatric:  - neg psychiatric ROS       Infectious Disease:  - neg infectious disease ROS       Malignancy:      - no malignancy   Other:    (+) Possibly pregnant                    [facility] AN PHYSICAL EXAM    Lab Results   Component Value Date    WBC 8.5 2018    HGB 10.9 (L) 2018    HCT 34.4 (L) 2018     2018       Preop Vitals  BP Readings from Last 3 Encounters:   18 121/75   18 120/84   18 111/70    Pulse Readings from Last 3 Encounters:   18 88   18 65   18 60      Resp Readings from Last 3 Encounters:   18 18    SpO2 Readings from Last 3 Encounters:   18 99%   18 99%   10/30/18 98%      Temp Readings from Last 1 Encounters:   18 36.9  C (98.4  F) (Axillary)    Ht Readings from Last 1 Encounters:   18 1.715 m (5' 7.5\")      Wt Readings from Last 1 Encounters:   18 89.4 kg (197 lb 3.2 oz)    Estimated body mass index is 30.43 kg/(m^2) as calculated from the following:    Height as of 18: 1.715 m (5' 7.5\").    Weight as of 18: 89.4 kg (197 lb 3.2 oz).     LDA:  Peripheral IV 18 Left Hand (Active)   Site Assessment WDL 2018 10:07 PM "   Line Status Saline locked 11/23/2018 10:07 PM   Phlebitis Scale 0-->no symptoms 11/23/2018 10:07 PM   Infiltration Scale 0 11/23/2018 10:07 PM   Infiltration Site Treatment Method  None 11/23/2018  7:00 PM   Number of days:1            Assessment:   ASA SCORE: 2       Documentation: H&P complete; Preop Testing complete; Consents complete   Proceeding: Proceed without further delay  Tobacco Use:  NO Active use of Tobacco/UNKNOWN Tobacco use status     Plan:   Anes. Type:  Regional     RA Details:  Labor/OB Procedure; Catheter     RA-Location/Type: Epidural (L)   Pre-Induction: None   Induction:  Not applicable   Airway: Native Airway   Access/Monitoring: PIV   Maintenance: LA-Catheter   Emergence: N/a   Logistics: Remote Procedure; Observation/Admission     Postop Pain/Sedation Strategy:  Advanced Options: LA-Catheter     PONV Management: NO PONV Prevention Needed  Adult Risk Factors: Female, Non-Smoker                         Dario Pavon MD

## 2018-11-24 NOTE — PLAN OF CARE
Problem: Labor (Cervical Ripen, Induct, Augment) (Adult,Obstetrics,Pediatric)  Goal: Signs and Symptoms of Listed Potential Problems Will be Absent, Minimized or Managed (Labor)  Signs and symptoms of listed potential problems will be absent, minimized or managed by discharge/transition of care (reference Labor (Cervical Ripen, Induct, Augment) (Adult,Obstetrics,Pediatric) CPG).  Outcome: Improving  Data: Patient presented to [facility] at 1724. Reason for maternal/fetal assessment per patient is Induction Of Labor  .for PD, Patient is a . Prenatal record reviewed.      Obstetric History       T0      L0     SAB0   TAB0   Ectopic0   Multiple0   Live Births0       # Outcome Date GA Lbr [place]/2nd Weight Sex Delivery Anes PTL Lv   1 Current               . Medical history: History reviewed. No pertinent past medical history.. Gestational Age 40w6d. VSS. Fetal movement present. Patient denies cramping, backache, vaginal discharge, pelvic pressure, UTI symptoms, GI problems, bloody show, vaginal bleeding, edema, headache, visual disturbances, epigastric or URQ pain, abdominal pain, rupture of membranes. Support persons  Royal present.  Action: Verbal consent for EFM. EFM applied for monitoring. Uterine assessment every 12 minutes apart, no feeling ctxs. Fetal assessment: Presumed adequate fetal oxygenation documented (see flow record).   Response: Dr. Boudreaux informed of pt arrival. Plan per provider is start induction with Vag misol. Patient verbalized agreement with plan.

## 2018-11-24 NOTE — PROVIDER NOTIFICATION
11/24/18 0255   Provider Notification   Provider Name/Title Dr. Gross   Method of Notification At Bedside   Notification Reason Status Update     Provider at bedside to preform SVE. When patient had came back from the bathroom heart tones were difficult to find. Once found, provider applied FSE. SVE was 4/90/+1. Patient getting epidural at this time,.

## 2018-11-24 NOTE — PROVIDER NOTIFICATION
11/24/18 0700   Provider Notification   Provider Name/Title Dr. Boudreaux   Method of Notification At Bedside;In Department   Notification Reason Status Update;SVE     Provider at bedside to check in with patient and preform SVE. Patient is 7-8/90/+1. Provider aware of prolonged that occurred during SVE. No other Concerns at this time.

## 2018-11-24 NOTE — PLAN OF CARE
Problem: Labor (Cervical Ripen, Induct, Augment) (Adult,Obstetrics,Pediatric)  Goal: Signs and Symptoms of Listed Potential Problems Will be Absent, Minimized or Managed (Labor)  Signs and symptoms of listed potential problems will be absent, minimized or managed by discharge/transition of care (reference Labor (Cervical Ripen, Induct, Augment) (Adult,Obstetrics,Pediatric) CPG).   Outcome: Improving  Delivery baby boy at 1006. NICU present at delivery for meconium not further assessment needed. Pt doing well.  Pain is well-controlled.  No fevers.  No history of foul-smelling vaginal discharge.  Good appetite.  Denies chest pain, shortness of breath, nausea or vomiting.  Vaginal bleeding is similar to a heavy menstrual flow.  Breastfeeding well.

## 2018-11-24 NOTE — DISCHARGE SUMMARY
[facility] Discharge Summary    Rashawn Jones MRN# [id number]   Age: 33 year old YOB: 1985     Date of Admission:  2018  Date of Discharge:  2018  Admitting Physician:  Laine Boudreaux MD  Discharge Physician:  Laine Boudreaux MD    Admit Dx:   - Intrauterine pregnancy at 41w0d   - GBS positive   - Gestational HTN  - Uterine fibroids     Discharge Dx:  - Same as above, s/p   - Acute blood loss anemia    Procedures:  - Spontaneous vaginal delivery  - Epidural analgesia    Admit HPI/Labor Course:  Patient's SVE on admission was , vaginal misoprostol was used for cervical ripening, and SROM occurred at 3-4cm. Labor progressed without augmentation. The patient progressed to complete and pushed for approximately 40 minutes. FHT was Cat I for the labor course. She subsequently had an uncomplicated  of a male infant at 10:06 on 2018 . The infant head was delivered in ALANNA position. Nuchal cord x1, delivered through without difficulty. Apgars were 8 and 9 and 1 and 5 minutes. Weight pending. Routine cord blood was obtained. IV pitocin was started for active management of the third stage. The placenta was delivered with gentle downward traction. It was found to be intact with a three vessel cord. Uterine tone was excellent. She sustained a second degree perineal laceration that was reinforced with a single 2-0 Vicryl interrupted stitch and then repaired in the usual fashion with a 3-0 Vicryl. Upon final inspection, there was good hemostasis. Instrument and sharp count was correct. QBL: 280 mL    Please see her Admission H&P and Delivery Summary for further details.    Postpartum Course:  Her postpartum course was uncomplicated . On PPD#1, she was meeting all of her postpartum goals and deemed stable for discharge. She was voiding without difficulty, tolerating a regular diet without nausea and vomiting, her pain was well controlled on oral pain  medicines and her lochia was appropriate. Her hemoglobin prior to delivery was 10.3 and after delivery was 8.2. She will discharge home with oral iron. Her Rh status was positive, and Rhogam was not indicated.     Discharge Medications:  Current Discharge Medication List      START taking these medications    Details   ferrous gluconate (FERGON) 324 (38 Fe) MG tablet Take 1 tablet (324 mg) by mouth daily (with breakfast)  Qty: 100 tablet, Refills: 1    Associated Diagnoses:  (normal spontaneous vaginal delivery); Anemia due to blood loss, acute      ibuprofen (ADVIL/MOTRIN) 800 MG tablet Take 1 tablet (800 mg) by mouth every 6 hours as needed for other (cramping)  Qty: 90 tablet, Refills: 0    Associated Diagnoses:  (normal spontaneous vaginal delivery)      senna-docusate (SENOKOT-S;PERICOLACE) 8.6-50 MG per tablet Take 1 tablet by mouth 2 times daily  Qty: 100 tablet, Refills: 0    Associated Diagnoses:  (normal spontaneous vaginal delivery)         CONTINUE these medications which have NOT CHANGED    Details   Ferrous Sulfate (IRON SUPPLEMENT PO) Take 18 mg by mouth      Prenatal Vit-Fe Fumarate-FA (PRENATAL VITAMIN PO) Take 1 tablet by mouth daily    Associated Diagnoses: Pregnancy confirmed by positive urine test      VITAMIN D, CHOLECALCIFEROL, PO Take 2,000 Units by mouth daily             Discharge/Disposition:  Rashawn Jones was discharged to home in stable condition with the following instructions/medications:  1) Call for temperature > 100.4, bright red vaginal bleeding >1 pad an hour x 2 hours, foul smelling vaginal discharge, pain not controlled by usual oral pain meds, persistent nausea and vomiting not controlled on medications  2) She desired possibly Nexplanon for contraception. Info given in hospital and will follow up at 6 weeks PP.   3) For feeding she decided to breast feed, which she was doing without issue prior to discharge.  4) She was instructed to follow-up with her primary OB  in 6 weeks for a routine postpartum visit and blood pressure check in 1 week.    Micki Strong  OB/GYN MS4  11/24/18 10:50 AM       Physician Attestation   I, Laine Boudreaux, have seen and examined this patient.  I have reviewed the above note and agree with discharge plan as documented in the above note.   Will have home care to follow up with patient for BP check.    Laine Boudreaux MD  November 25, 2018

## 2018-11-24 NOTE — PROVIDER NOTIFICATION
11/24/18 0030   Provider Notification   Provider Name/Title Dr. oBudreaux   Method of Notification At Bedside;In Department   Request Evaluate in Person   Notification Reason Uterine Activity;Status Update;SVE;Decels     Notified provider that patient continues to have late decelerations despite position changes, o2 being applied. Provider also notified that patient continues to contract ever 1-2 minutes. Provider at bedside to preform SVE. Patient is 3/60/-3 at this time. Provider ok with patient to continue to move around. Will continue to monitor and update provider with any changes or concerns.

## 2018-11-24 NOTE — PLAN OF CARE
Problem: Patient Care Overview  Goal: Plan of Care/Patient Progress Review  Outcome: No Change  Patient continued to contract with the one dose of vaginal miso. Patient SROM'd at 0230 with meconium stained fluid. FHR had late, variable, early and prolonged decels throughout the night, but recovered with resuscitative measures. Patient became very uncomfortable, and got an epidural placed at 0330. Patient had elevated blood pressures but denies any signs or symptoms of pre-e and hellp labs all WDL. No concerns at this time. Will continue to monitor and update provider with any changes or concerns.

## 2018-11-24 NOTE — PROVIDER NOTIFICATION
11/23/18 2300   Provider Notification   Provider Name/Title Dr. Boudreaux   Method of Notification At Bedside;In Department   Request Evaluate in Person   Notification Reason Status Update     Provider at bedside to review strip. FHR baseline up to 170's at this time. Provider also notified of variable decels as well. Patient turned on her side, and IV fluid bolus of 500 initiated. Will continue to monitor and update provider with any changes or concerns.

## 2018-11-24 NOTE — PROVIDER NOTIFICATION
11/23/18 2032   Provider Notification   Provider Name/Title Dr. Boudreaux   Method of Notification In Department   Notification Reason Status Update     Notified provider that patient is not feeling contractions at this time, and wanting to rest. Provider encouraging patient to walk around after a little nap. No other concerns at this time.

## 2018-11-24 NOTE — PLAN OF CARE
Problem: Patient Care Overview  Goal: Plan of Care/Patient Progress Review  Outcome: Therapy, progress toward functional goals as expected  Patient arrived to [facility] unit via wheelchair at 1230,with belongings, accompanied by spouse/ significant other, with infant in arms. Received report from LARISA Yoder and checked bands. Unit and room orientation completd. Call light given; no concerns present at this time.     Postpartum assessments WNL, VSS. Rashawn reports minimal pain, is taking tylenol and ibuprofen. Using ice/tucks on perineum. She voided before transfer to [facility]. She is breastfeeding her infant Denny with assistance from RN with positioning. Her  Royal is present and supportive.   Continue with plan of care.

## 2018-11-24 NOTE — ANESTHESIA PROCEDURE NOTES
Combined Spinal/Epidural procedure note    Staff  Anesthesiologist:  RODOLFO HAND  Resident/CRNA:  ETHAN MONROE  CSE performed by resident/CRNA in presence of a teaching physician    Location:  OB  Procedure start time:  11/24/2018 3:10 AM  Procedure end time:  11/24/2018 3:40 AM    Timeout  patient identified, IV checked, site marked, risks and benefits discussed, informed consent, monitors and equipment checked, pre-op evaluation, at physician/surgeon's request and post-op pain management    Correct Patient: Yes    Correct Position: Yes    Correct Site: Yes    Correct Procedure: Yes    Correct Laterality:  Yes  Site Marked:  Yes    Procedure details  Procedure:  Combined Spinal/Epidural (CSE)  Position:  Sitting  ASA:  2  Sterile Prep: chloraprep, patient draped, mask and sterile gloves    Insertion site:  L3-4  Local skin infiltration:  1% lidocaine  Approach:  Midline  Epidural Needle Type:  Levy  Needle gauge (G):  17  Needle length (in):  3.5  Injection Technique:  LORT saline  HERBERT at (cm):  7  Attempts:  2  Redirects:  2  Spinal Needle (G):  25  Spinal Needle Type:  Antionette  Spinal Needle Length (in):  4 11/16  Catheter gauge (G):  19  Catheter threaded easily: Yes    Threaded to skin (cm) :  11  Threaded in epidural space (cm):  4  Paresthesias:  No  CSF with Epidural needle: No    CSF with Spinal needle: Yes    Aspiration negative for Heme or CSF: Yes    Test dose (mL):  3  Local anesthetic for test dose:  Lidocaine 1.5% w/ 1:200,000 epinephrine  Test dose time:  03:33  Test dose negative for signs of intravascular, subdural or intrathecal injection: Yes     First attempt with paresthesia and upon redirect had CSF flow from epidural needle.  Second attempt successful without issue.

## 2018-11-24 NOTE — PROVIDER NOTIFICATION
11/24/18 0230   Provider Notification   Provider Name/Title Dr. Gross   Method of Notification In Department   Notification Reason Membrane Status     Notified provider that patient SROM'd with meconium stained fluid. Provider ok with patient getting an epidural.

## 2018-11-24 NOTE — PROGRESS NOTES
[facility] Labor and Delivery Progress Note    Rashawn Jones MRN# [id number]   Age: 33 year old YOB: 1985           Subjective:   Patient feeling ctx's, but tolerable           Objective:   Patient Vitals for the past 8 hrs:   BP Temp Temp src Pulse Resp   11/23/18 2300 - 98.4  F (36.9  C) Axillary - 18   11/23/18 2207 127/70 - - - -   11/23/18 2159 143/78 - - - -   11/23/18 1808 120/74 - - - 20   11/23/18 1757 (!) 135/96 98.1  F (36.7  C) - 88 20        Cervical Exam: 3-4 cm/ 70% / -3   Bloody show, small bulgy bag noted    Membranes: Intact     Fetal Heart Rate:    Monitor: External US    Variability: Moderate    Baseline Rate: 155 bpm    Fetal Heart Rate Tracing: variability is moderate, there have been intermittent late decels and also period of fetal tachycardia up to 170's which resolved with 500 ml fluid bolus.  Patient is monica on her own now about q 1-2 min.   Will continue to monitor fetal heart tones.         Assessment:   Patient s/p 1 dose of vaginal cytotec, now monica q 1-2 min spontaneously  FHT's with intermittent tachycardia and late decels, improved with IVF  GBS Pos -- s/p 2 doses of PCN          Plan:   Continue to monitor for cervical change  Follow FHT's  AROM when cervix more dilated.       Laine Boudreaux MD

## 2018-11-24 NOTE — H&P
[facility] Labor and Delivery History and Physical    Rashawn Jones MRN# [id number]   Age: 33 year old YOB: 1985     Date of Admission:  2018    Primary care provider: Peri, [place] [facility]           Chief Complaint:   Rashawn Jones is a 33 year old female at 40w6d who presents for induction of labor due to late term and favorable tejeda's score.   Patient has been feeling well. Has mild B-H ctx's, no LOF or bloody show but has noted mucus discharge.   Baby is moving well.  Patient requests to come in to start the induction at this time.            Pregnancy history:     Pregnancy complicated by:  New diagnosis of uterine fibroids with pregnancy  GBS positive       OBSTETRIC HISTORY:    Obstetric History       T0      L0     SAB0   TAB0   Ectopic0   Multiple0   Live Births0       # Outcome Date GA Lbr [place]/2nd Weight Sex Delivery Anes PTL Lv   1 Current                   EDC: Estimated Date of Delivery: 18    Prenatal Labs:   Lab Results   Component Value Date    ABO B 2018    RH Pos 2018    AS Neg 2018    HEPBANG Nonreactive 2018    TREPAB Negative 2018    HGB 11.4 (L) 2018       GBS Status:   Lab Results   Component Value Date    GBS Positive (A) 10/23/2018       Active Problem List  Patient Active Problem List   Diagnosis     Uterine fibroid in pregnancy     4.8 cm retroplacental fibroid  Did not affect growth of baby.  Last growth US showed growth at 62% at 32 wks.      GBS positive        Medication Prior to Admission  Prescriptions Prior to Admission   Medication Sig Dispense Refill Last Dose     Ferrous Sulfate (IRON SUPPLEMENT PO) Take 18 mg by mouth   Taking     Prenatal Vit-Fe Fumarate-FA (PRENATAL VITAMIN PO) Take 1 tablet by mouth daily   Taking     VITAMIN D, CHOLECALCIFEROL, PO Take 2,000 Units by mouth daily   Taking   .        Maternal Past Medical History:   History reviewed. No pertinent past medical  history.                    Family History:     Family History   Problem Relation Age of Onset     Hypertension Mother      Hypertension Father      Diabetes Father      Cancer Maternal Grandfather      Skin/ Nose?     Diabetes Paternal Grandmother      KIDNEY DISEASE Paternal Grandmother      was on dialysis     Myocardial Infarction Paternal Grandfather 50     x2,  from 2nd heart attack     No Known Problems Brother      No Known Problems Sister      No Known Problems Sister      Family history reviewed            Social History:     Social History   Substance Use Topics     Smoking status: Never Smoker     Smokeless tobacco: Never Used     Alcohol use No            Review of Systems:   The Review of Systems is negative other than noted in the HPI          Physical Exam:     Vitals were reviewed  Patient Vitals for the past 8 hrs:   BP Temp Pulse Resp   18 1808 120/74 - - 20   18 1757 (!) 135/96 98.1  F (36.7  C) 88 20     Constitutional:   awake, alert, cooperative, no apparent distress, and appears stated age   normal respiratory and cardiovascular effort   Abdomen:   Soft, nontender  Uterus:  Gravid, NT, cephalic,  EFW ~ 8 lbs      Musculoskeletal:   BLE:  Trace lower extremity pitting edema present, nontender      Cervix:   Membranes: intact   Dilation: 2-3   Effacement: 60%   Station:-3   Consistency: average   Position: Mid  Presentation:Cephalic --- confirmed by bedside US, baby is direct OP  Fetal Heart Rate Tracing: EFM:   150 baseline, moderate variablility, + accels, no decels, Category I ,   NST is reactive  Tocometer: spontaneous ctx's q 12 min    Procedure:   Cervix as above   cytotec placed in posterior fornix at 1841 without complication.  Patient tolerated it well.    Moderate bloody show noted                       Assessment:   Rashawn Jones is a  at 40w6d here for induction.   Cervix favorable  GBS positive  Uterine fibroids - retroplacental but adequate fetal growth          Plan:   Will begin with a dose of cytotec vaginally and the likely proceed with pitocin and AROM  discussed moving around and changing positions in labor to help rotate fetal head to OA  Reviewed all pain medication options  Admit - see IP orders  Prophylactic antibiotic for + GBS status    Laine Boudreaux MD

## 2018-11-24 NOTE — PROGRESS NOTES
[facility] Labor and Delivery Progress Note    Rashawn Jones MRN# [id number]   Age: 33 year old YOB: 1985           Subjective:       comfortable with epidural.          Objective:   Patient Vitals for the past 8 hrs:   BP Temp Temp src Resp SpO2   18 0700 140/74 - - 20 98 %   18 0650 139/65 - - - 99 %   18 0633 129/68 - - - -   18 0619 134/67 - - - -   18 0604 142/77 98.5  F (36.9  C) Oral 18 -   18 0547 149/83 - - - 97 %   18 0517 129/63 98.5  F (36.9  C) Oral 18 -   18 0402 144/60 - - 16 -   18 0400 142/77 98.8  F (37.1  C) Oral - -   18 0359 138/67 - - 18 98 %   18 0356 132/77 - - 18 -   18 0355 131/73 - - 18 -   18 0353 (!) 125/97 - - 18 -   18 0350 133/83 - - 18 -   18 0348 130/78 - - - -   18 0346 128/76 - - 16 -   18 0345 134/68 - - 16 -   18 0342 132/83 - - 18 -   18 0340 130/86 - - 18 -   18 0338 141/89 - - 18 -   18 0336 138/86 - - 18 -   18 0334 (!) 145/91 - - 18 98 %   18 0306 144/90 - - - -   18 0300 - 98.3  F (36.8  C) Oral 18 -   18 0110 - 98.3  F (36.8  C) Oral - -   18 0023 121/75 - - 18 -   18 0000 - 98.4  F (36.9  C) Axillary 18 -        Cervical Exam: 7.5 / 90 / +1      Position: Mid,  Fetal position is LOT    Membranes: Ruptured      Fetal Heart Rate:    Monitor: Fetal Scalp Electrode    Variability: Moderate    Baseline Rate: 150 bpm    Fetal Heart Rate Tracing: moderate variability, nonrepetative late and early decels,           Assessment:   Rashawn Jones is a 33 year old  at 41w0d active labor with meconium stained fluid  gestational hypertension   Normal HELLP labs  GBS +             Plan:   Continue Prophylactic antibiotic for + GBS status  Patient using peanut ball   Close fetal monitoring  Anticipate       Laine Boudreaux MD

## 2018-11-25 VITALS
HEART RATE: 68 BPM | DIASTOLIC BLOOD PRESSURE: 85 MMHG | OXYGEN SATURATION: 100 % | SYSTOLIC BLOOD PRESSURE: 143 MMHG | TEMPERATURE: 97.6 F | RESPIRATION RATE: 18 BRPM

## 2018-11-25 LAB — HGB BLD-MCNC: 8.4 G/DL (ref 11.7–15.7)

## 2018-11-25 PROCEDURE — 85018 HEMOGLOBIN: CPT | Performed by: STUDENT IN AN ORGANIZED HEALTH CARE EDUCATION/TRAINING PROGRAM

## 2018-11-25 PROCEDURE — 25000132 ZZH RX MED GY IP 250 OP 250 PS 637: Performed by: STUDENT IN AN ORGANIZED HEALTH CARE EDUCATION/TRAINING PROGRAM

## 2018-11-25 PROCEDURE — 36415 COLL VENOUS BLD VENIPUNCTURE: CPT | Performed by: STUDENT IN AN ORGANIZED HEALTH CARE EDUCATION/TRAINING PROGRAM

## 2018-11-25 RX ORDER — FERROUS GLUCONATE 324(38)MG
324 TABLET ORAL
Qty: 100 TABLET | Refills: 1 | Status: SHIPPED | OUTPATIENT
Start: 2018-11-25 | End: 2019-02-05

## 2018-11-25 RX ADMIN — IBUPROFEN 800 MG: 800 TABLET, FILM COATED ORAL at 07:34

## 2018-11-25 RX ADMIN — SENNOSIDES AND DOCUSATE SODIUM 2 TABLET: 8.6; 5 TABLET ORAL at 10:03

## 2018-11-25 RX ADMIN — ACETAMINOPHEN 650 MG: 325 TABLET, FILM COATED ORAL at 07:34

## 2018-11-25 RX ADMIN — ACETAMINOPHEN 650 MG: 325 TABLET, FILM COATED ORAL at 01:16

## 2018-11-25 RX ADMIN — IBUPROFEN 800 MG: 800 TABLET, FILM COATED ORAL at 01:16

## 2018-11-25 NOTE — PROGRESS NOTES
[facility]   Post-partum Note    Name:  Rashawn Jones  MRN: [id number]    S: Patient doing well, tired from breastfeeding overnight.  Pain well controlled.  Tolerating regular diet without nausea or vomiting.  Ambulating without dizziness. Voiding without issue and passing flautus. Lochia moderate.  Breast feeding.  Plans discharge later today. No headache, no chest pain or shortness of breath, no RUQ/epigastric pain.     O:   Patient Vitals for the past 24 hrs:   BP Temp Temp src Pulse Heart Rate Resp SpO2   18 0113 125/71 98.1  F (36.7  C) Oral 77 - 16 -   18 1904 132/83 98  F (36.7  C) Oral 75 - 16 -   18 1538 125/74 98.3  F (36.8  C) Oral 74 - 16 100 %   18 1245 131/82 98.3  F (36.8  C) Oral - 72 16 100 %   18 1156 129/67 - - - - - -   18 1142 125/65 - - - - - -   18 1126 136/78 - - - - 20 98 %   18 1111 140/76 - - - - - -   18 1056 137/77 - - - - 20 98 %   18 1041 136/72 - - - - 20 98 %   18 1026 135/64 - - - - 18 98 %   18 1006 147/82 98.4  F (36.9  C) Oral - - 18 98 %   18 1000 - - - - - - 98 %   18 0909 142/80 98.2  F (36.8  C) Oral - - 20 99 %   18 0830 - - - - - - 98 %   18 0810 137/77 98.1  F (36.7  C) Oral - - 20 98 %     Gen:  Resting comfortably, NAD  CV:  Normal perfusion  Pulm:  No respiratory distress   Abd:  Soft, appropriately ttp, non-distended. Fundus at umbilicus, firm and non-tender.  Ext:  non-tender, 1+ LE edema b/l    I/O last 3 completed shifts:  In: 3133.67 [P.O.:850; I.V.:2283.67]  Out: 880 [Urine:600; Blood:280]    Hgb:   Hemoglobin   Date Value Ref Range Status   2018 8.4 (L) 11.7 - 15.7 g/dL Final       Assessment/Plan:  33 year old  on PPD #1 s/p .  - continue with routine postpartum management  Pain: Well-controlled with ibuprofen and tylenol  Hgb: 10.7>> 8.4. Asymptomatic from acute blood loss anemia. Will discharge home with oral  iron.   CV: Gestational HTN, but has been normotensive since delivery and denies preeclampsia sx.  Rh: pos  Rubella: immune  Feed: breast  BC: Interested in Nexplanon at 6w PP visit, will give info today   Dispo: DC likely PPD#1-2. Feels like they would be ready to go today.     Micki Strong MD  Ob/Gyn, PGY-1  11/25/2018, 6:43 AM        Physician Attestation   I, Laine Bourdeaux, personally saw and evaluated Rashawn.  I have reviewed and discussed with the resident their discharge plan.  My key history or physical exam findings from the day of discharge: patient is voiding, tolerating a regular diet and passing gas.  Bleeding is appropriate and vitals are stable.   Key management decisions made by me: ok for d/c today.  Discussed postpartum instructions/restrictions and follow up.       Laine Boudreaux MD  November 25, 2018

## 2018-11-25 NOTE — PLAN OF CARE
Problem: Patient Care Overview  Goal: Plan of Care/Patient Progress Review  Outcome: Improving  Data: Vital signs within normal limits. Postpartum checks within normal limits - see flow record. Patient eating and drinking normally. Patient able to empty bladder independently and is up ambulating. Patient performing self cares and is able to care for infant. Breastfeeding with assistance in latching and positioning baby.   Action: Pain has been adequately managed with oral pain medications and lavender aromatherapy stick. Patient also took a warm sitz bath this evening.   Response: Positive attachment behaviors observed with infant. Support person, : mahamed Paige.   Plan: Continue with the plan of care.

## 2018-11-25 NOTE — PLAN OF CARE
Problem: Patient Care Overview  Goal: Plan of Care/Patient Progress Review  Outcome: Improving  Patients vitals have been stable. Postpartum assessment WDL. States that she is voiding without difficulties and feels she is emptying her bladder. States that she does have a slight headache, but declines vision changes, blurred vision, and epigastric pain. Is taking ibuprofen and tylenol for pain. Will continue to monitor for adequate pain control.

## 2018-11-25 NOTE — PLAN OF CARE
Problem: Patient Care Overview  Goal: Plan of Care/Patient Progress Review  Outcome: Adequate for Discharge Date Met: 11/25/18  Patient c/o of headache this afternoon. Patient claims that has not slept or drunk any water. Patient drinking water, reviewed signs of high blood pressure with patient. /85. Patient has a follow up appointment to check blood pressure. Patient given discharge medications. Home care RN too to check BP. Discharge instructions reviewed with patient and spouse. Patient discharged to home.

## 2018-11-25 NOTE — PLAN OF CARE
Patient has discharge orders, to discharge in the evening after working on breastfeeding per provider. Patient currently sleeping.

## 2018-11-26 NOTE — PLAN OF CARE
Patient transferred to front entrance of hospital via wheelchair while holding baby. Bands double checked with patient, baby and FOB. FOB placed baby in carseat, and patient and family were discharged from the hospital.

## 2018-11-27 ENCOUNTER — DOCUMENTATION ONLY (OUTPATIENT)
Dept: CARE COORDINATION | Facility: CLINIC | Age: 33
End: 2018-11-27

## 2018-11-27 NOTE — PROGRESS NOTES
[id number] Score                 [facility] will be sharing updates with you on Maternal Child Health Referral requests for home care services.  This is for care coordination purposes and alert you to referral status.  We received the referral for  Rashawn Jones; MRN [id number] and want to update you:    [facility] has made two  attempts to contact patient by phone and text message over the last two  days.   We have not had any response from patient.  Final message was left advising patient to follow up with Primary Care Providers for mom and baby.       Sincerely [facility]  Vivi Anthony  [phone]

## 2018-11-29 ENCOUNTER — OFFICE VISIT (OUTPATIENT)
Dept: OBGYN | Facility: CLINIC | Age: 33
End: 2018-11-29
Payer: COMMERCIAL

## 2018-11-29 VITALS
HEIGHT: 68 IN | HEART RATE: 72 BPM | TEMPERATURE: 98.1 F | WEIGHT: 194.5 LBS | OXYGEN SATURATION: 99 % | SYSTOLIC BLOOD PRESSURE: 132 MMHG | BODY MASS INDEX: 29.48 KG/M2 | DIASTOLIC BLOOD PRESSURE: 87 MMHG

## 2018-11-29 DIAGNOSIS — R51.9 ACUTE NONINTRACTABLE HEADACHE, UNSPECIFIED HEADACHE TYPE: Primary | ICD-10-CM

## 2018-11-29 PROCEDURE — 99213 OFFICE O/P EST LOW 20 MIN: CPT | Mod: 24 | Performed by: OBSTETRICS & GYNECOLOGY

## 2018-11-29 NOTE — MR AVS SNAPSHOT
"              After Visit Summary   11/29/2018    Rashawn Jones    MRN: [id number]           Patient Information     Date Of Birth          1985        Visit Information        Provider Department      11/29/2018 1:00 PM Laine Boudreaux MD [facility]        Today's Diagnoses     Acute nonintractable headache, unspecified headache type    -  1    Gestational hypertension with significant proteinuria, delivered           Follow-ups after your visit        Who to contact     If you have questions or need follow up information about today's clinic visit or your schedule please contact [facility] directly at [phone].  Normal or non-critical lab and imaging results will be communicated to you by [employer]hart, letter or phone within 4 business days after the clinic has received the results. If you do not hear from us within 7 days, please contact the clinic through [employer] or phone. If you have a critical or abnormal lab result, we will notify you by phone as soon as possible.  Submit refill requests through [employer] or call your pharmacy and they will forward the refill request to us. Please allow 3 business days for your refill to be completed.          Additional Information About Your Visit        MyChart Information     [employer] gives you secure access to your electronic health record. If you see a primary care provider, you can also send messages to your care team and make appointments. If you have questions, please call your primary care clinic.  If you do not have a primary care provider, please call [phone] and they will assist you.        Care EveryWhere ID     This is your Care EveryWhere ID. This could be used by other organizations to access your [facility] medical records  [id number]        Your Vitals Were     Pulse Temperature Height Last Period Pulse Oximetry Breastfeeding?    72 98.1  F (36.7  C) (Oral) 5' 7.5\" (1.715 m) 02/10/2018 99% Yes    BMI (Body Mass " Index)                   30.01 kg/m2            Blood Pressure from Last 3 Encounters:   11/29/18 132/87   11/25/18 143/85   11/20/18 120/84    Weight from Last 3 Encounters:   11/29/18 194 lb 8 oz (88.2 kg)   11/20/18 197 lb 3.2 oz (89.4 kg)   11/13/18 196 lb 3.2 oz (89 kg)              Today, you had the following     No orders found for display         Today's Medication Changes          These changes are accurate as of 11/29/18  7:34 PM.  If you have any questions, ask your nurse or doctor.               These medicines have changed or have updated prescriptions.        Dose/Directions    IRON SUPPLEMENT PO   This may have changed:  Another medication with the same name was removed. Continue taking this medication, and follow the directions you see here.   Changed by:  Laine Boudreaux MD        Refills:  0                Primary Care Provider Office Phone # Fax #    [facility] [phone]       [address] [place] [address]        Equal Access to Services     RUTHIE THAYER AH: Hadii loraine hiceky hadasho Soomaali, waaxda luqadaha, qaybta kaalmada adeegyada, waxay garry welch . So [facility] [phone].    ATENCIÓN: Si habla español, tiene a kirkpatrick disposición servicios gratuitos de asistencia lingüística. Llame al [phone].    We comply with applicable federal civil rights laws and [place] laws. We do not discriminate on the basis of race, color, national origin, age, disability, sex, sexual orientation, or gender identity.            Thank you!     Thank you for choosing [facility]  for your care. Our goal is always to provide you with excellent care. Hearing back from our patients is one way we can continue to improve our services. Please take a few minutes to complete the written survey that you may receive in the mail after your visit with us. Thank you!             Your Updated Medication List - Protect others around you: Learn how  to safely use, store and throw away your medicines at www.disposemymeds.org.          This list is accurate as of 18  7:34 PM.  Always use your most recent med list.                   Brand Name Dispense Instructions for use Diagnosis    ferrous gluconate 324 (38 Fe) MG tablet    FERGON    100 tablet    Take 1 tablet (324 mg) by mouth daily (with breakfast)     (normal spontaneous vaginal delivery), Anemia due to blood loss, acute       ibuprofen 800 MG tablet    ADVIL/MOTRIN    90 tablet    Take 1 tablet (800 mg) by mouth every 6 hours as needed for other (cramping)     (normal spontaneous vaginal delivery)       IRON SUPPLEMENT PO           PRENATAL VITAMIN PO      Take 1 tablet by mouth daily    Pregnancy confirmed by positive urine test       senna-docusate 8.6-50 MG tablet    SENOKOT-S/PERICOLACE    100 tablet    Take 1 tablet by mouth 2 times daily     (normal spontaneous vaginal delivery)       TYLENOL PO           VITAMIN D (CHOLECALCIFEROL) PO      Take 2,000 Units by mouth daily

## 2018-11-30 NOTE — PROGRESS NOTES
"  Rashawn Jones is a 33 year old s/p  4 days ago here for BP check   Labor was complicated by gestational hypertension.  She was not discharged on any blood pressure medication.  Reports that she has had a headache ever since she got home from the hospital.  The past 2 days a headache started about 2:30 in the afternoon and improved when she laid flat on her back.  Today the headache started this morning and is been very persistent.  She is also noticed increased swelling today in her ankles.    Royal her  is here today and reports that he has been checking her blood pressure at home and generally her blood pressures have been in the normal range 120s over 70s.    She is breast-feeding and that is going well.   She is not bleeding too much.    Overall is doing well.  Baby has been easy so far.        OBJECTIVE:   /87 (BP Location: Left arm, Patient Position: Sitting, Cuff Size: Adult Large)  Pulse 72  Temp 98.1  F (36.7  C) (Oral)  Ht 5' 7.5\" (1.715 m)  Wt 194 lb 8 oz (88.2 kg)  LMP 02/10/2018  SpO2 99%  Breastfeeding? Yes  BMI 30.01 kg/m2     BP today 153/88 --->  132/87     Gen: no apparent distress, face Is swollen  Patient is alert and oriented times three.   Lungs: CTA   CV: RRR   ABD:  Soft, NT  Uterus U-1 firm  BLE:  1+ edema          ASSESSMENT:  Encounter Diagnoses   Name Primary?     Acute nonintractable headache, unspecified headache type Yes     Gestational hypertension with significant proteinuria, delivered       Suspect this is an epidural HA    PLAN:   discussed treatment options for HA,  She will try rest today and tonight on her back, try a little caffeine and if no improvement by tomorrow, she will call doc on call to get set up for anesthesia consult for a blood patch.  Blood pressure is not in the severe range, so we will just have her continue to monitor that. Seems to be better at home, than here.    Patient to call with  worsening swelling or persistent headache.  "   Patient has been working from home already a little bit. Discussed the need to rest.      Laine Boudreaux MD

## 2019-02-05 ENCOUNTER — OFFICE VISIT (OUTPATIENT)
Dept: OBGYN | Facility: CLINIC | Age: 34
End: 2019-02-05
Payer: COMMERCIAL

## 2019-02-05 VITALS
DIASTOLIC BLOOD PRESSURE: 76 MMHG | HEART RATE: 62 BPM | HEIGHT: 64 IN | TEMPERATURE: 97.9 F | OXYGEN SATURATION: 100 % | WEIGHT: 170.8 LBS | SYSTOLIC BLOOD PRESSURE: 139 MMHG | BODY MASS INDEX: 29.16 KG/M2

## 2019-02-05 DIAGNOSIS — Z30.09 ENCOUNTER FOR COUNSELING REGARDING CONTRACEPTION: ICD-10-CM

## 2019-02-05 DIAGNOSIS — Z30.017 NEXPLANON INSERTION: ICD-10-CM

## 2019-02-05 LAB — BETA HCG QUAL IFA URINE: NEGATIVE

## 2019-02-05 PROCEDURE — 11981 INSERTION DRUG DLVR IMPLANT: CPT | Performed by: OBSTETRICS & GYNECOLOGY

## 2019-02-05 PROCEDURE — 99207 ZZC POST PARTUM EXAM: CPT | Performed by: OBSTETRICS & GYNECOLOGY

## 2019-02-05 PROCEDURE — 84703 CHORIONIC GONADOTROPIN ASSAY: CPT | Performed by: OBSTETRICS & GYNECOLOGY

## 2019-02-05 ASSESSMENT — ANXIETY QUESTIONNAIRES
3. WORRYING TOO MUCH ABOUT DIFFERENT THINGS: SEVERAL DAYS
7. FEELING AFRAID AS IF SOMETHING AWFUL MIGHT HAPPEN: NOT AT ALL
1. FEELING NERVOUS, ANXIOUS, OR ON EDGE: NOT AT ALL
2. NOT BEING ABLE TO STOP OR CONTROL WORRYING: NOT AT ALL
GAD7 TOTAL SCORE: 1
5. BEING SO RESTLESS THAT IT IS HARD TO SIT STILL: NOT AT ALL
6. BECOMING EASILY ANNOYED OR IRRITABLE: NOT AT ALL
IF YOU CHECKED OFF ANY PROBLEMS ON THIS QUESTIONNAIRE, HOW DIFFICULT HAVE THESE PROBLEMS MADE IT FOR YOU TO DO YOUR WORK, TAKE CARE OF THINGS AT HOME, OR GET ALONG WITH OTHER PEOPLE: NOT DIFFICULT AT ALL

## 2019-02-05 ASSESSMENT — PATIENT HEALTH QUESTIONNAIRE - PHQ9
5. POOR APPETITE OR OVEREATING: NOT AT ALL
SUM OF ALL RESPONSES TO PHQ QUESTIONS 1-9: 2

## 2019-02-05 ASSESSMENT — MIFFLIN-ST. JEOR: SCORE: 1466.8

## 2019-02-05 NOTE — PATIENT INSTRUCTIONS
What Nexplanon Users May Expect    For appropiate patients, Nexplanon is well tolerated and has a low early-removal rate.    Insertion site complications, such as prolonged pain or infection, are rare. Removal is occasionally difficult, and rarely requires a surgical procedure in the operating room.    Menstrual changes are common with Nexplanon. Bleeding may become more or less frequent or heavy, or absent. The bleeding pattern after the first three months is predictive of future bleeding, but the pattern may change at any time. Average bleeding is 18 days over 3 months. Over 50% of women experience rare over absent bleeding over the two year period, while 25% experience frequent or prolonged bleeding.    In clinical studies, users gained 3.7 pounds over two years. It is unknown what portion of this weight gain is related to Nexplanon    Women with a history of depressed mood may have worsening on Nexplanon, and may need to have the device removed.    Return to baseline ovulation patterns is seen 7-14 days after removal of Nexplanon.    Rarely, headaches and acne have also let to device removal.    Nexplanon may be less effective in women weight more than 130% of their ideal body weight.    Nexplanon does not protect against HIV or STDs.    Please call [facility] at [phone] if you have questions or concerns.    For more complete information:  [email or web address]

## 2019-02-05 NOTE — PROGRESS NOTES
"Chief Complaint   Patient presents with     Post Partum Exam     Contraception       Initial /76 (BP Location: Left arm, Patient Position: Sitting, Cuff Size: Adult Large)   Pulse 62   Temp 97.9  F (36.6  C) (Oral)   Ht 1.613 m (5' 3.5\")   Wt 77.5 kg (170 lb 12.8 oz)   SpO2 100%   Breastfeeding? Yes   BMI 29.78 kg/m   Estimated body mass index is 29.78 kg/m  as calculated from the following:    Height as of this encounter: 1.613 m (5' 3.5\").    Weight as of this encounter: 77.5 kg (170 lb 12.8 oz).  BP completed using cuff size: large    Questioned patient about current smoking habits.  Pt. has never smoked.          The following HM Due: NONE      The following patient reported/Care Every where data was sent to:  P ABSTRACT QUALITY INITIATIVES [[id number]]  n/a      n/a and patient has appointment for today        NEXPLANON    LOT:  [id number]   EXP: 2021   NDC: 2186-9871-95    SUBJECTIVE:  Rashawn Jones is a 33 year old female  here for a postpartum visit.  She had a  11 weeks ago delivering a healthy baby boy at term.      delivery complications:  No  breast feeding:  Yes  bladder problems:  No  bowel problems/hemorrhoids:  No  episiotomy/laceration/incision healed? Yes  vaginal flow:  None  Sam [place]:  Yes, using condoms, no unprotected intercourse in the past 2-3 weeks  contraception: Wants Nexplanon  emotional adjustment:  doing well, happy and tired  back to work: Yes  Lab Results   Component Value Date    PAP NIL 2018      PHQ-9 SCORE 2018 10/23/2018 2019   PHQ-9 Total Score 0 1 2         OBJECTIVE:  Blood pressure 139/76, pulse 62, temperature 97.9  F (36.6  C), temperature source Oral, height 1.613 m (5' 3.5\"), weight 77.5 kg (170 lb 12.8 oz), SpO2 100 %, currently breastfeeding.   General - pleasant female in no acute distress.  Breast - no nodularity, asymmetry or nipple discharge bilaterally.  Abdomen - soft, nontender, nondistended, no " hepatosplenomegaly.  Pelvic - EG: normal adult female, BUS: within normal limits, Vagina: well rugated, no discharge, Cervix: no lesions or CMT, Uterus: firm, normal sized and nontender, Adnexae: no masses or tenderness.  Rectovaginal - deferred.    ASSESSMENT:  normal postpartum exam    PLAN:  Discussed contraceptive options  May resume normal activities without restrictions  Pap smear was not  done today           CC: nexplanon insertion    HPI: Rashawn Jones is a 33 year old   who is here for nexplanon insertion.     We have discussed options for long term reversible contraception including nexplanon, depo provera, IUD and she has chosen nexplanon.  She is otherwise without complaints.  No LMP recorded. Patient is not currently having periods (Reason: Breast Feeding)..     No past medical history on file.    Past Surgical History:   Procedure Laterality Date     NO HISTORY OF SURGERY           Family History   Problem Relation Age of Onset     Hypertension Mother      Hypertension Father      Diabetes Father      Cancer Maternal Grandfather         Skin/ Nose?     Diabetes Paternal Grandmother      Kidney Disease Paternal Grandmother         was on dialysis     Myocardial Infarction Paternal Grandfather 50        x2,  from 2nd heart attack     No Known Problems Brother      No Known Problems Sister      No Known Problems Sister           Allergies   Allergen Reactions     No Clinical Screening - See Comments      Chick peas and cumcumber       Current Outpatient Medications   Medication Sig Dispense Refill     Ferrous Sulfate (IRON SUPPLEMENT PO)        Prenatal Vit-Fe Fumarate-FA (PRENATAL VITAMIN PO) Take 1 tablet by mouth daily       VITAMIN D, CHOLECALCIFEROL, PO Take 2,000 Units by mouth daily         Social History     Socioeconomic History     Marital status:      Spouse name: Not on file     Number of children: Not on file     Years of education: Not on file     Highest education level:  "Not on file   Social Needs     Financial resource strain: Not on file     Food insecurity - worry: Not on file     Food insecurity - inability: Not on file     Transportation needs - medical: Not on file     Transportation needs - non-medical: Not on file   Occupational History     Not on file   Tobacco Use     Smoking status: Never Smoker     Smokeless tobacco: Never Used   Substance and Sexual Activity     Alcohol use: No     Drug use: No     Sexual activity: Yes     Partners: Male     Birth control/protection: None   Other Topics Concern     Parent/sibling w/ CABG, MI or angioplasty before 65F 55M? Not Asked   Social History Narrative     Not on file       /76 (BP Location: Left arm, Patient Position: Sitting, Cuff Size: Adult Large)   Pulse 62   Temp 97.9  F (36.6  C) (Oral)   Ht 1.613 m (5' 3.5\")   Wt 77.5 kg (170 lb 12.8 oz)   SpO2 100%   Breastfeeding? Yes   BMI 29.78 kg/m      Gen: Healthy appearing female in NAD  Abd: soft, NT, ND  Ext: no c/c/e.  Left arm without abnormalities.    Procedure:  After informed consent was obtained, the patient was positioned on the exam table with the left arm extended and elbow bent at 90 degree angle.  The biceps grove was identified and a shayla was placed 8-10cm from the medial epicondyle of the humerus.  A second shayla was placed a few cm past the original shayla as a guide.  The insertion site was then swabbed with betadine x 3.  5cc of 1% lidocaine was injected along the insertion tract.  Next the nexplanon was inserted without difficulty in the recommended fashion.  The device was removed and the implant was both visualized and palpated by myself and the patient.  A small amount of bleeding was noted at the insertion site.  A gauze and pressure wrap was applied to the insertion site.  She tolerated the procedure well.      A/P  1) Contraception, successful nexplanon insertion   We discussed signs/symptoms of infection and when to call.  We again reviewed " potential side effects including irregular bleeding.  She is to call with any questions.  Otherwise, RTC prn.  She should use backup contraception for 3 weeks.  Laine Boudreaux    10 minutes of this 30 minute visit was spent on face to face counseling about the nexplanon, the risks/benefits, side effects and placement.

## 2019-02-06 ASSESSMENT — ANXIETY QUESTIONNAIRES: GAD7 TOTAL SCORE: 1

## 2020-10-22 ENCOUNTER — OFFICE VISIT (OUTPATIENT)
Dept: OBGYN | Facility: CLINIC | Age: 35
End: 2020-10-22
Payer: COMMERCIAL

## 2020-10-22 VITALS
WEIGHT: 152.6 LBS | HEIGHT: 64 IN | BODY MASS INDEX: 26.05 KG/M2 | SYSTOLIC BLOOD PRESSURE: 133 MMHG | DIASTOLIC BLOOD PRESSURE: 89 MMHG | HEART RATE: 69 BPM

## 2020-10-22 DIAGNOSIS — Z30.46 NEXPLANON REMOVAL: Primary | ICD-10-CM

## 2020-10-22 DIAGNOSIS — Z23 NEED FOR PROPHYLACTIC VACCINATION AND INOCULATION AGAINST INFLUENZA: ICD-10-CM

## 2020-10-22 PROCEDURE — 90686 IIV4 VACC NO PRSV 0.5 ML IM: CPT | Performed by: OBSTETRICS & GYNECOLOGY

## 2020-10-22 PROCEDURE — 90471 IMMUNIZATION ADMIN: CPT | Performed by: OBSTETRICS & GYNECOLOGY

## 2020-10-22 ASSESSMENT — MIFFLIN-ST. JEOR: SCORE: 1374.25

## 2020-10-22 ASSESSMENT — PAIN SCALES - GENERAL: PAINLEVEL: NO PAIN (0)

## 2020-10-22 NOTE — PROGRESS NOTES
"Chief Complaint   Patient presents with     Contraception     removal        Initial /89 (BP Location: Right arm, Patient Position: Sitting, Cuff Size: Adult Regular)   Pulse 69   Ht 1.613 m (5' 3.5\")   Wt 69.2 kg (152 lb 9.6 oz)   Breastfeeding Yes   BMI 26.61 kg/m   Estimated body mass index is 26.61 kg/m  as calculated from the following:    Height as of this encounter: 1.613 m (5' 3.5\").    Weight as of this encounter: 69.2 kg (152 lb 9.6 oz).  BP completed using cuff size: regular    Questioned patient about current smoking habits.  Pt. has never smoked.          The following HM Due: Vaccinations: flu       The following patient reported/Care Every where data was sent to:  P ABSTRACT QUALITY INITIATIVES [[id number]]  n/a      patient has appointment for today and orders have been placed        v  HPI:  Rashawn Jones is a 35 year old female here for a consultation regarding: would like her nexplanon removed today. Considering another pregnancy.   Would also like a flu shot.     Patient's last menstrual period was 10/12/2020.   First period was august this yr.   Bled the whole month of September.   Then normal period this month.  For seven days.   Still breast feeding about 4 - 5 times a day.           Past GYN history:   Lab Results   Component Value Date    PAP NIL 2018         Nexplanon Removal:     Is a pregnancy test required: No.  Was a consent obtained?  Yes    Rashawn Jones is here for removal of etonogestrel implant Nexplanon/Implanon      Preoperative Diagnosis: etonogestrel implant  Postoperative Diagnosis: etonogestrel implant removed    Technique: On the left arm  Skin prep Betadine  Anesthesia 1% lidocaine  Procedure: Small incision (<5mm) was made at distal end of palpable implant, curved hemostat or mosquito forceps was used to isolate the implant and bring it to the incision, the fibrous capsule containing the implant  was incised and the Implant was removed " intact.      EBL: minimal  Complications:  No  Tolerance:  Pt tolerated procedure well and was in stable condition.   Dressing:    A pressure bandage was placed for the next 12-24 hours.    Contraception was discussed and patient chose the following method condoms      ASSESSMENT:   Encounter Diagnoses   Name Primary?     Nexplanon removal Yes     Need for prophylactic vaccination and inoculation against influenza         PLAN:    nexplanon removed.   Flu shot done.     Follow up: Pt was instructed to call if bleeding, severe pain or foul smell.     Laine Boudreaux MD

## 2021-01-03 ENCOUNTER — HEALTH MAINTENANCE LETTER (OUTPATIENT)
Age: 36
End: 2021-01-03

## 2021-10-10 ENCOUNTER — HEALTH MAINTENANCE LETTER (OUTPATIENT)
Age: 36
End: 2021-10-10

## 2022-01-29 ENCOUNTER — HEALTH MAINTENANCE LETTER (OUTPATIENT)
Age: 37
End: 2022-01-29

## 2022-09-18 ENCOUNTER — HEALTH MAINTENANCE LETTER (OUTPATIENT)
Age: 37
End: 2022-09-18

## 2023-05-06 ENCOUNTER — HEALTH MAINTENANCE LETTER (OUTPATIENT)
Age: 38
End: 2023-05-06